# Patient Record
Sex: FEMALE | Race: WHITE | NOT HISPANIC OR LATINO | Employment: OTHER | URBAN - METROPOLITAN AREA
[De-identification: names, ages, dates, MRNs, and addresses within clinical notes are randomized per-mention and may not be internally consistent; named-entity substitution may affect disease eponyms.]

---

## 2023-02-08 PROBLEM — R26.9 GAIT DISTURBANCE: Status: ACTIVE | Noted: 2023-02-08

## 2023-02-08 PROBLEM — E78.5 HYPERLIPIDEMIA: Status: ACTIVE | Noted: 2023-02-08

## 2023-02-08 PROBLEM — L71.9 ROSACEA: Status: ACTIVE | Noted: 2023-02-08

## 2023-02-08 PROBLEM — K21.9 ESOPHAGEAL REFLUX: Status: ACTIVE | Noted: 2023-02-08

## 2023-02-08 PROBLEM — R74.8 ELEVATED ALKALINE PHOSPHATASE LEVEL: Status: ACTIVE | Noted: 2023-02-08

## 2023-02-08 PROBLEM — M54.2 NECK PAIN: Status: ACTIVE | Noted: 2023-02-08

## 2023-02-08 PROBLEM — R05.9 COUGH: Status: ACTIVE | Noted: 2023-02-08

## 2023-02-08 PROBLEM — M54.50 LOWER BACK PAIN: Status: ACTIVE | Noted: 2023-02-08

## 2023-02-08 PROBLEM — L30.9 ECZEMA OF BOTH HANDS: Status: ACTIVE | Noted: 2023-02-08

## 2023-02-08 PROBLEM — K83.8 DILATED INTRAHEPATIC BILE DUCT: Status: ACTIVE | Noted: 2023-02-08

## 2023-02-08 PROBLEM — J01.90 ACUTE SINUSITIS: Status: ACTIVE | Noted: 2023-02-08

## 2023-02-08 PROBLEM — E11.65 TYPE 2 DIABETES MELLITUS WITH HYPERGLYCEMIA, WITHOUT LONG-TERM CURRENT USE OF INSULIN (MULTI): Status: ACTIVE | Noted: 2023-02-08

## 2023-02-08 PROBLEM — M43.10 SPONDYLOLISTHESIS, ACQUIRED: Status: ACTIVE | Noted: 2023-02-08

## 2023-02-08 PROBLEM — H91.90 HEARING LOSS: Status: ACTIVE | Noted: 2023-02-08

## 2023-02-08 PROBLEM — R10.30 LOWER ABDOMINAL PAIN: Status: ACTIVE | Noted: 2023-02-08

## 2023-02-08 PROBLEM — L30.9 ECZEMA OF FACE: Status: ACTIVE | Noted: 2023-02-08

## 2023-02-08 PROBLEM — N60.01 CYST OF RIGHT BREAST: Status: ACTIVE | Noted: 2023-02-08

## 2023-02-08 PROBLEM — R49.0 HOARSENESS: Status: ACTIVE | Noted: 2023-02-08

## 2023-02-08 PROBLEM — N32.81 OVERACTIVE BLADDER: Status: ACTIVE | Noted: 2023-02-08

## 2023-02-08 PROBLEM — R10.2 PELVIC PAIN IN FEMALE: Status: ACTIVE | Noted: 2023-02-08

## 2023-02-08 PROBLEM — F32.9 MAJOR DEPRESSIVE DISORDER: Status: ACTIVE | Noted: 2023-02-08

## 2023-02-08 PROBLEM — M47.812 CERVICAL SPONDYLOSIS: Status: ACTIVE | Noted: 2023-02-08

## 2023-02-08 PROBLEM — R92.8 MAMMOGRAM ABNORMAL: Status: ACTIVE | Noted: 2023-02-08

## 2023-02-08 PROBLEM — M43.06 LUMBAR SPONDYLOLYSIS: Status: ACTIVE | Noted: 2023-02-08

## 2023-02-08 PROBLEM — R63.4 ABNORMAL WEIGHT LOSS: Status: ACTIVE | Noted: 2023-02-08

## 2023-02-08 PROBLEM — R32 URINARY INCONTINENCE: Status: ACTIVE | Noted: 2023-02-08

## 2023-02-08 PROBLEM — E55.9 VITAMIN D DEFICIENCY: Status: ACTIVE | Noted: 2023-02-08

## 2023-02-08 PROBLEM — F43.0 STRESS REACTION, ACUTE, PREDOMINANT EMOTIONAL: Status: ACTIVE | Noted: 2023-02-08

## 2023-02-08 PROBLEM — G43.909 MIGRAINE, UNSPECIFIED, NOT INTRACTABLE, WITHOUT STATUS MIGRAINOSUS: Status: ACTIVE | Noted: 2023-02-08

## 2023-02-08 PROBLEM — R30.0 DYSURIA: Status: ACTIVE | Noted: 2023-02-08

## 2023-02-08 PROBLEM — E53.8 VITAMIN B12 DEFICIENCY: Status: ACTIVE | Noted: 2023-02-08

## 2023-02-08 PROBLEM — R53.83 FATIGUE: Status: ACTIVE | Noted: 2023-02-08

## 2023-02-08 PROBLEM — R31.9 HEMATURIA: Status: ACTIVE | Noted: 2023-02-08

## 2023-02-08 PROBLEM — R10.84 GENERALIZED ABDOMINAL DISCOMFORT: Status: ACTIVE | Noted: 2023-02-08

## 2023-02-08 PROBLEM — D72.829 LEUKOCYTOSIS: Status: ACTIVE | Noted: 2023-02-08

## 2023-02-08 PROBLEM — M25.551 RIGHT HIP PAIN: Status: ACTIVE | Noted: 2023-02-08

## 2023-02-08 PROBLEM — R10.12 INTERMITTENT LEFT UPPER QUADRANT ABDOMINAL PAIN: Status: ACTIVE | Noted: 2023-02-08

## 2023-02-08 PROBLEM — R11.0 DAILY NAUSEA: Status: ACTIVE | Noted: 2023-02-08

## 2023-02-08 PROBLEM — Q76.2 CONGENITAL SPONDYLOLISTHESIS: Status: ACTIVE | Noted: 2023-02-08

## 2023-02-08 PROBLEM — U07.1 COVID-19 VIRUS INFECTION: Status: ACTIVE | Noted: 2023-02-08

## 2023-02-08 RX ORDER — NAPROXEN 500 MG/1
500 TABLET ORAL 2 TIMES DAILY PRN
COMMUNITY

## 2023-02-08 RX ORDER — SIMVASTATIN 40 MG/1
40 TABLET, FILM COATED ORAL DAILY
COMMUNITY
End: 2023-06-21 | Stop reason: SDUPTHER

## 2023-02-08 RX ORDER — METFORMIN HYDROCHLORIDE 500 MG/1
500 TABLET, EXTENDED RELEASE ORAL
COMMUNITY
End: 2023-09-27 | Stop reason: WASHOUT

## 2023-02-08 RX ORDER — IBUPROFEN 600 MG/1
600 TABLET ORAL
COMMUNITY

## 2023-02-08 RX ORDER — OXYBUTYNIN CHLORIDE 10 MG/1
10 TABLET, EXTENDED RELEASE ORAL DAILY
COMMUNITY
End: 2023-09-27

## 2023-02-08 RX ORDER — ALBUTEROL SULFATE 90 UG/1
2 AEROSOL, METERED RESPIRATORY (INHALATION) EVERY 6 HOURS PRN
COMMUNITY

## 2023-02-08 RX ORDER — ONDANSETRON 4 MG/1
4 TABLET, FILM COATED ORAL EVERY 8 HOURS PRN
COMMUNITY
End: 2023-05-22 | Stop reason: SDUPTHER

## 2023-02-08 RX ORDER — CITALOPRAM 20 MG/1
40 TABLET, FILM COATED ORAL DAILY
COMMUNITY
End: 2023-03-13 | Stop reason: ALTCHOICE

## 2023-03-13 ENCOUNTER — OFFICE VISIT (OUTPATIENT)
Dept: PRIMARY CARE | Facility: CLINIC | Age: 53
End: 2023-03-13
Payer: COMMERCIAL

## 2023-03-13 VITALS
BODY MASS INDEX: 14.92 KG/M2 | SYSTOLIC BLOOD PRESSURE: 135 MMHG | HEIGHT: 59 IN | HEART RATE: 97 BPM | WEIGHT: 74 LBS | DIASTOLIC BLOOD PRESSURE: 93 MMHG

## 2023-03-13 DIAGNOSIS — E11.65 TYPE 2 DIABETES MELLITUS WITH HYPERGLYCEMIA, WITHOUT LONG-TERM CURRENT USE OF INSULIN (MULTI): ICD-10-CM

## 2023-03-13 DIAGNOSIS — F33.41 RECURRENT MAJOR DEPRESSIVE DISORDER, IN PARTIAL REMISSION (CMS-HCC): ICD-10-CM

## 2023-03-13 DIAGNOSIS — R03.0 ELEVATED BLOOD PRESSURE READING WITHOUT DIAGNOSIS OF HYPERTENSION: ICD-10-CM

## 2023-03-13 DIAGNOSIS — N63.20 MASS OF LEFT BREAST, UNSPECIFIED QUADRANT: Primary | ICD-10-CM

## 2023-03-13 PROCEDURE — 99214 OFFICE O/P EST MOD 30 MIN: CPT | Performed by: INTERNAL MEDICINE

## 2023-03-13 PROCEDURE — 3080F DIAST BP >= 90 MM HG: CPT | Performed by: INTERNAL MEDICINE

## 2023-03-13 PROCEDURE — 3075F SYST BP GE 130 - 139MM HG: CPT | Performed by: INTERNAL MEDICINE

## 2023-03-13 RX ORDER — CITALOPRAM 40 MG/1
40 TABLET, FILM COATED ORAL DAILY
Qty: 30 TABLET | Refills: 5 | Status: SHIPPED | OUTPATIENT
Start: 2023-03-13 | End: 2023-06-21 | Stop reason: ALTCHOICE

## 2023-03-13 NOTE — PROGRESS NOTES
"Subjective   She is here with her ex  and her grandson, Moises.  I am not sure if this appointment was scheduled as an ER follow-up.  The patient is just able to tell me that \"her mother got a text saying she had an appointment.\"  Mel Lantigua is a 53 y.o. female who presents for Diabetes and also a breast lump.  She went to the ER recently at [facility] because she had a lump on her left breast that was painful.  She said that her mother felt the lump.   She says the other day she felt a lump under her armpit.   She says they told her to get a mammogram.   Her last mammo ws 2021, and was normal.     She also mentioned that she's been having erratic sugars.   She says that she had a sugar that was over 200 a few mornings ago.  Today she says it was 179.    She mentioned that she is tired a lot and she is concerned about it.  She had labs today which included a CBC TSH CMP and A1c.    She says that when she gets tired she goes to take a nap and sometimes a nap will last up to 4 hours.  She says she thinks that people are mad at her when she takes a nap.  She mentions that she just has no motivation.    Review of Systems    Objective   BP (!) 135/93   Pulse 97   Ht 1.499 m (4' 11\")   Wt 33.6 kg (74 lb)   BMI 14.95 kg/m²    Physical Exam  Cardiovascular:      Rate and Rhythm: Normal rate and regular rhythm.      Heart sounds: Normal heart sounds.   Pulmonary:      Effort: Pulmonary effort is normal.      Breath sounds: Normal breath sounds.   Neurological:      General: No focal deficit present.      Mental Status: She is alert.         Assessment/Plan   Problem List Items Addressed This Visit          Other    Major depressive disorder: I think that this is part of the reason behind her fatigue.  She is under lots of stress on a chronic basis.  We will increase her citalopram dose from 20 to 40 mg and see if this helps.  I sent in a new prescription.    Relevant Medications    citalopram (CeleXA) " 40 mg tablet     Other Visit Diagnoses       Mass of left breast, unspecified quadrant    -  Primary. Gave order for diagnostic mammogram. I was not able to access her ER report.  Her ex- who was with her was upset because she was charged when she had a mammogram.    Relevant Orders    BI mammo bilateral screening tomosynthesis        Type 2 diabetes mellitus with hyperglycemia without long-term insulin use: Await today's lab results.  She remains on metformin alone.  Asked her to bring her blood sugar meter with her to her next visit.  Elevated blood pressure without hypertension: Continue to monitor her blood pressures.

## 2023-03-13 NOTE — PATIENT INSTRUCTIONS
Begin taking two of the 20 mg tablets of citalopram at one time every day,.  I sent in a new prescription for citalopram 40 mg.   Once you are done with the 20 mg tabs start taking just one of the 40 mg tabs.  Schedule a DIAGNOSITC MAMMOGRAM .    Bring your blood sugar meter with you to your next visit.   Schedule a 30 min visit with me after you get the mammogram done.

## 2023-03-14 LAB
ALANINE AMINOTRANSFERASE (SGPT) (U/L) IN SER/PLAS: 8 U/L (ref 7–45)
ALBUMIN (G/DL) IN SER/PLAS: 4.2 G/DL (ref 3.4–5)
ALKALINE PHOSPHATASE (U/L) IN SER/PLAS: 168 U/L (ref 33–110)
ANION GAP IN SER/PLAS: 14 MMOL/L (ref 10–20)
ASPARTATE AMINOTRANSFERASE (SGOT) (U/L) IN SER/PLAS: 12 U/L (ref 9–39)
BASOPHILS (10*3/UL) IN BLOOD BY AUTOMATED COUNT: 0.05 X10E9/L (ref 0–0.1)
BASOPHILS/100 LEUKOCYTES IN BLOOD BY AUTOMATED COUNT: 0.5 % (ref 0–2)
BILIRUBIN TOTAL (MG/DL) IN SER/PLAS: 0.3 MG/DL (ref 0–1.2)
CALCIUM (MG/DL) IN SER/PLAS: 9.6 MG/DL (ref 8.6–10.6)
CARBON DIOXIDE, TOTAL (MMOL/L) IN SER/PLAS: 31 MMOL/L (ref 21–32)
CHLORIDE (MMOL/L) IN SER/PLAS: 102 MMOL/L (ref 98–107)
CHOLESTEROL (MG/DL) IN SER/PLAS: 133 MG/DL (ref 0–199)
CHOLESTEROL IN HDL (MG/DL) IN SER/PLAS: 54 MG/DL
CHOLESTEROL/HDL RATIO: 2.5
CREATININE (MG/DL) IN SER/PLAS: 0.59 MG/DL (ref 0.5–1.05)
EOSINOPHILS (10*3/UL) IN BLOOD BY AUTOMATED COUNT: 0.39 X10E9/L (ref 0–0.7)
EOSINOPHILS/100 LEUKOCYTES IN BLOOD BY AUTOMATED COUNT: 4.1 % (ref 0–6)
ERYTHROCYTE DISTRIBUTION WIDTH (RATIO) BY AUTOMATED COUNT: 14.1 % (ref 11.5–14.5)
ERYTHROCYTE MEAN CORPUSCULAR HEMOGLOBIN CONCENTRATION (G/DL) BY AUTOMATED: 32.6 G/DL (ref 32–36)
ERYTHROCYTE MEAN CORPUSCULAR VOLUME (FL) BY AUTOMATED COUNT: 100 FL (ref 80–100)
ERYTHROCYTES (10*6/UL) IN BLOOD BY AUTOMATED COUNT: 4.28 X10E12/L (ref 4–5.2)
ESTIMATED AVERAGE GLUCOSE FOR HBA1C: 148 MG/DL
GFR FEMALE: >90 ML/MIN/1.73M2
GLUCOSE (MG/DL) IN SER/PLAS: 107 MG/DL (ref 74–99)
HEMATOCRIT (%) IN BLOOD BY AUTOMATED COUNT: 42.6 % (ref 36–46)
HEMOGLOBIN (G/DL) IN BLOOD: 13.9 G/DL (ref 12–16)
HEMOGLOBIN A1C/HEMOGLOBIN TOTAL IN BLOOD: 6.8 %
IMMATURE GRANULOCYTES/100 LEUKOCYTES IN BLOOD BY AUTOMATED COUNT: 0.3 % (ref 0–0.9)
LDL: 63 MG/DL (ref 0–99)
LEUKOCYTES (10*3/UL) IN BLOOD BY AUTOMATED COUNT: 9.5 X10E9/L (ref 4.4–11.3)
LYMPHOCYTES (10*3/UL) IN BLOOD BY AUTOMATED COUNT: 3.47 X10E9/L (ref 1.2–4.8)
LYMPHOCYTES/100 LEUKOCYTES IN BLOOD BY AUTOMATED COUNT: 36.7 % (ref 13–44)
MONOCYTES (10*3/UL) IN BLOOD BY AUTOMATED COUNT: 0.76 X10E9/L (ref 0.1–1)
MONOCYTES/100 LEUKOCYTES IN BLOOD BY AUTOMATED COUNT: 8 % (ref 2–10)
NEUTROPHILS (10*3/UL) IN BLOOD BY AUTOMATED COUNT: 4.76 X10E9/L (ref 1.2–7.7)
NEUTROPHILS/100 LEUKOCYTES IN BLOOD BY AUTOMATED COUNT: 50.4 % (ref 40–80)
NRBC (PER 100 WBCS) BY AUTOMATED COUNT: 0.3 /100 WBC (ref 0–0)
PLATELETS (10*3/UL) IN BLOOD AUTOMATED COUNT: 360 X10E9/L (ref 150–450)
POTASSIUM (MMOL/L) IN SER/PLAS: 4.3 MMOL/L (ref 3.5–5.3)
PROTEIN TOTAL: 7 G/DL (ref 6.4–8.2)
SODIUM (MMOL/L) IN SER/PLAS: 143 MMOL/L (ref 136–145)
THYROTROPIN (MIU/L) IN SER/PLAS BY DETECTION LIMIT <= 0.05 MIU/L: 2.4 MIU/L (ref 0.44–3.98)
TRIGLYCERIDE (MG/DL) IN SER/PLAS: 82 MG/DL (ref 0–149)
UREA NITROGEN (MG/DL) IN SER/PLAS: 14 MG/DL (ref 6–23)
VLDL: 16 MG/DL (ref 0–40)

## 2023-03-22 ENCOUNTER — OFFICE VISIT (OUTPATIENT)
Dept: PRIMARY CARE | Facility: CLINIC | Age: 53
End: 2023-03-22
Payer: COMMERCIAL

## 2023-03-22 VITALS
DIASTOLIC BLOOD PRESSURE: 73 MMHG | SYSTOLIC BLOOD PRESSURE: 111 MMHG | HEIGHT: 57 IN | HEART RATE: 89 BPM | BODY MASS INDEX: 16.61 KG/M2 | WEIGHT: 77 LBS

## 2023-03-22 DIAGNOSIS — N60.01 CYST OF RIGHT BREAST: ICD-10-CM

## 2023-03-22 DIAGNOSIS — R74.8 ELEVATED ALKALINE PHOSPHATASE LEVEL: ICD-10-CM

## 2023-03-22 DIAGNOSIS — E11.65 TYPE 2 DIABETES MELLITUS WITH HYPERGLYCEMIA, WITHOUT LONG-TERM CURRENT USE OF INSULIN (MULTI): Primary | ICD-10-CM

## 2023-03-22 PROCEDURE — 3044F HG A1C LEVEL LT 7.0%: CPT | Performed by: INTERNAL MEDICINE

## 2023-03-22 PROCEDURE — 3074F SYST BP LT 130 MM HG: CPT | Performed by: INTERNAL MEDICINE

## 2023-03-22 PROCEDURE — 3078F DIAST BP <80 MM HG: CPT | Performed by: INTERNAL MEDICINE

## 2023-03-22 PROCEDURE — 99214 OFFICE O/P EST MOD 30 MIN: CPT | Performed by: INTERNAL MEDICINE

## 2023-03-22 NOTE — PATIENT INSTRUCTIONS
It is ok to use immodium ( loperamide) as needed for diarrhea.   Write down your sugars when you check them and bring them to your next visit.   You can use a warm wet washcloth and hold it on your armpit for 15 min a day.   This can help it to shrink the lump.   Remain on the 40 mg of celexa.   See me again in 3 months.   Get blood test done close to next visit.

## 2023-03-22 NOTE — PROGRESS NOTES
"Subjective   Mel Lantigua is a 53 y.o. female who presents for Follow-up.  She says the breast lump is still there  We did discuss normal mammogram results.     She is taking the  40 mg  citalopram now  She takes naps but less.   She displays poor insight     She has been having diarrhea at times  She wants a medicine for diarrhea  It does not seem to be related to meals  She does note that spicy food does not agree with her  She gets cramping at times  She had what she described as bad abdominal pain. It went away after 10 minutes.  She had a bowel accident today ; this embarrassed her.       Her Aic was 6.8  Her alk phos was 168: we will repeat.         Review of Systems    Objective   /73   Pulse 89   Ht 1.448 m (4' 9\")   Wt 34.9 kg (77 lb)   BMI 16.66 kg/m²    Physical Exam  Cardiovascular:      Rate and Rhythm: Normal rate and regular rhythm.      Heart sounds: Normal heart sounds.   Pulmonary:      Effort: Pulmonary effort is normal.      Breath sounds: Normal breath sounds.   Abdominal:      General: Abdomen is flat.      Palpations: Abdomen is soft. There is no mass.      Tenderness: There is abdominal tenderness. There is no guarding or rebound.      Comments: Mild diffuse tenderness.    Neurological:      Mental Status: She is alert.   Psychiatric:         Mood and Affect: Mood normal.         Assessment/Plan   Problem List Items Addressed This Visit          Endocrine/Metabolic    Type 2 diabetes mellitus with hyperglycemia, without long-term current use of insulin (CMS/[facility]) - Primary    Relevant Orders    Comprehensive metabolic panel    Hemoglobin A1c       Other    Cyst of right breast    Elevated alkaline phosphatase level: may be related to her dilated bile duct.     Relevant Orders    Comprehensive metabolic panel          Diarrhea: it is difficult to assess how often she has symptoms. Seems very embarassed by her accident.         "

## 2023-05-22 DIAGNOSIS — R11.0 DAILY NAUSEA: Primary | ICD-10-CM

## 2023-05-22 RX ORDER — ONDANSETRON 4 MG/1
4 TABLET, FILM COATED ORAL EVERY 8 HOURS PRN
Qty: 20 TABLET | Refills: 1 | Status: SHIPPED | OUTPATIENT
Start: 2023-05-22 | End: 2024-01-31 | Stop reason: SDUPTHER

## 2023-06-21 ENCOUNTER — OFFICE VISIT (OUTPATIENT)
Dept: PRIMARY CARE | Facility: CLINIC | Age: 53
End: 2023-06-21
Payer: COMMERCIAL

## 2023-06-21 VITALS
DIASTOLIC BLOOD PRESSURE: 80 MMHG | OXYGEN SATURATION: 96 % | WEIGHT: 76.8 LBS | BODY MASS INDEX: 16.57 KG/M2 | SYSTOLIC BLOOD PRESSURE: 106 MMHG | HEIGHT: 57 IN | HEART RATE: 90 BPM

## 2023-06-21 DIAGNOSIS — F33.41 RECURRENT MAJOR DEPRESSIVE DISORDER, IN PARTIAL REMISSION (CMS-HCC): ICD-10-CM

## 2023-06-21 DIAGNOSIS — E78.2 MIXED HYPERLIPIDEMIA: Primary | ICD-10-CM

## 2023-06-21 DIAGNOSIS — N39.41 URGE INCONTINENCE OF URINE: ICD-10-CM

## 2023-06-21 DIAGNOSIS — E11.65 TYPE 2 DIABETES MELLITUS WITH HYPERGLYCEMIA, WITHOUT LONG-TERM CURRENT USE OF INSULIN (MULTI): ICD-10-CM

## 2023-06-21 DIAGNOSIS — R11.0 NAUSEA: ICD-10-CM

## 2023-06-21 PROCEDURE — 99214 OFFICE O/P EST MOD 30 MIN: CPT | Performed by: INTERNAL MEDICINE

## 2023-06-21 PROCEDURE — 3079F DIAST BP 80-89 MM HG: CPT | Performed by: INTERNAL MEDICINE

## 2023-06-21 PROCEDURE — 3074F SYST BP LT 130 MM HG: CPT | Performed by: INTERNAL MEDICINE

## 2023-06-21 PROCEDURE — 3044F HG A1C LEVEL LT 7.0%: CPT | Performed by: INTERNAL MEDICINE

## 2023-06-21 RX ORDER — METFORMIN HYDROCHLORIDE 500 MG/1
500 TABLET ORAL NIGHTLY
COMMUNITY
End: 2023-09-27 | Stop reason: ALTCHOICE

## 2023-06-21 RX ORDER — ONDANSETRON 4 MG/1
4 TABLET, ORALLY DISINTEGRATING ORAL EVERY 8 HOURS PRN
Qty: 30 TABLET | Refills: 5 | Status: SHIPPED | OUTPATIENT
Start: 2023-06-21 | End: 2023-09-27 | Stop reason: SDUPTHER

## 2023-06-21 RX ORDER — CITALOPRAM 40 MG/1
40 TABLET, FILM COATED ORAL DAILY
Qty: 90 TABLET | Refills: 1 | Status: SHIPPED | OUTPATIENT
Start: 2023-06-21 | End: 2023-09-27 | Stop reason: SDUPTHER

## 2023-06-21 RX ORDER — SIMVASTATIN 40 MG/1
40 TABLET, FILM COATED ORAL NIGHTLY
COMMUNITY
End: 2024-06-04 | Stop reason: WASHOUT

## 2023-06-21 RX ORDER — ACETAMINOPHEN AND IBUPROFEN 250; 125 MG/1; MG/1
TABLET, FILM COATED ORAL
COMMUNITY

## 2023-06-21 RX ORDER — SIMVASTATIN 40 MG/1
40 TABLET, FILM COATED ORAL NIGHTLY
Qty: 90 TABLET | Refills: 1 | Status: SHIPPED | OUTPATIENT
Start: 2023-06-21 | End: 2023-09-27 | Stop reason: SDUPTHER

## 2023-06-21 RX ORDER — SIMVASTATIN 40 MG/1
1 TABLET, FILM COATED ORAL DAILY
COMMUNITY
End: 2024-06-04 | Stop reason: WASHOUT

## 2023-06-21 RX ORDER — METFORMIN HYDROCHLORIDE 500 MG/1
500 TABLET, EXTENDED RELEASE ORAL 2 TIMES DAILY
Qty: 180 TABLET | Refills: 1 | Status: SHIPPED | OUTPATIENT
Start: 2023-06-21 | End: 2023-09-27 | Stop reason: SDUPTHER

## 2023-06-21 RX ORDER — METFORMIN HYDROCHLORIDE 500 MG/1
TABLET, EXTENDED RELEASE ORAL
COMMUNITY
Start: 2018-12-05 | End: 2023-06-21 | Stop reason: SDUPTHER

## 2023-06-21 RX ORDER — CITALOPRAM 20 MG/1
40 TABLET, FILM COATED ORAL DAILY
COMMUNITY
End: 2023-06-21 | Stop reason: ALTCHOICE

## 2023-06-21 ASSESSMENT — PATIENT HEALTH QUESTIONNAIRE - PHQ9
7. TROUBLE CONCENTRATING ON THINGS, SUCH AS READING THE NEWSPAPER OR WATCHING TELEVISION: NOT AT ALL
3. TROUBLE FALLING OR STAYING ASLEEP OR SLEEPING TOO MUCH: NEARLY EVERY DAY
5. POOR APPETITE OR OVEREATING: NOT AT ALL
2. FEELING DOWN, DEPRESSED OR HOPELESS: SEVERAL DAYS
SUM OF ALL RESPONSES TO PHQ9 QUESTIONS 1 AND 2: 3
6. FEELING BAD ABOUT YOURSELF - OR THAT YOU ARE A FAILURE OR HAVE LET YOURSELF OR YOUR FAMILY DOWN: MORE THAN HALF THE DAYS
10. IF YOU CHECKED OFF ANY PROBLEMS, HOW DIFFICULT HAVE THESE PROBLEMS MADE IT FOR YOU TO DO YOUR WORK, TAKE CARE OF THINGS AT HOME, OR GET ALONG WITH OTHER PEOPLE: NOT DIFFICULT AT ALL
SUM OF ALL RESPONSES TO PHQ QUESTIONS 1-9: 12
9. THOUGHTS THAT YOU WOULD BE BETTER OFF DEAD, OR OF HURTING YOURSELF: NOT AT ALL
4. FEELING TIRED OR HAVING LITTLE ENERGY: NEARLY EVERY DAY
1. LITTLE INTEREST OR PLEASURE IN DOING THINGS: MORE THAN HALF THE DAYS
8. MOVING OR SPEAKING SO SLOWLY THAT OTHER PEOPLE COULD HAVE NOTICED. OR THE OPPOSITE, BEING SO FIGETY OR RESTLESS THAT YOU HAVE BEEN MOVING AROUND A LOT MORE THAN USUAL: SEVERAL DAYS

## 2023-06-21 ASSESSMENT — ENCOUNTER SYMPTOMS
DEPRESSION: 1
OCCASIONAL FEELINGS OF UNSTEADINESS: 0
LOSS OF SENSATION IN FEET: 0

## 2023-06-21 NOTE — PATIENT INSTRUCTIONS
Take just ONE metformin twice a day  Take one metformin in the morning and one in evening.     See me in 3 months.

## 2023-06-21 NOTE — PROGRESS NOTES
"Subjective   Mel Lantigua is a 53 y.o. female who presents for Follow-up (3 month follow-up).    Weight was 77 at last visit    He sugars were good   Usually less than 120    She is having diarrhea a few times a week  She takes kaopectate and sometimes immodium.    Having urinary issues  Says that at times she is not able to hold her urine  Sometimes she take a long time to finish urinating: incomplete emptying  They are going to be driving down to [place] in about 2 weeks.   She says she wants to get some Dep[employer] for the drive to [place]    She says he wants to stop smoking  She smokes less than a pack a day  She is coughing    Taking 2 metformin twice a day      Review of Systems    Objective   /80   Pulse 90   Ht 1.448 m (4' 9\")   Wt (!) 34.8 kg (76 lb 12.8 oz)   SpO2 96%   BMI 16.62 kg/m²    Physical Exam  Visit Vitals  /80   Pulse 90   Ht 1.448 m (4' 9\")   Wt (!) 34.8 kg (76 lb 12.8 oz)   SpO2 96%   BMI 16.62 kg/m²   Smoking Status Every Day   BSA 1.18 m²      GEN: NAD  HEENT: normal  NECK: no adenopathy, no thyroid enlargment  LUNGS: CTAB  CV: reg S1/S2 no murmurs  EXT: no leg edema   Assessment/Plan   Problem List Items Addressed This Visit    None    Type 2 dm with hyperglycemia w/o insulin use: check labs before next visit. Remain on current meds. She's having some trouble with her meter. She does check her sugars once a day have been in 120-130.  Urinary incontinence: she tried oxybutinin but it did not help . Discussed referral to Dr Doran for urodynamics. She is not sure about this and wants to discuss more at next visit.   Hyperlipid: remain on simvastatin.   Cough : she is still smoking, is about a pack a day, sometimes less.    See me in 3 mo.   "

## 2023-06-30 ENCOUNTER — APPOINTMENT (OUTPATIENT)
Dept: PRIMARY CARE | Facility: CLINIC | Age: 53
End: 2023-06-30
Payer: COMMERCIAL

## 2023-09-27 ENCOUNTER — OFFICE VISIT (OUTPATIENT)
Dept: PRIMARY CARE | Facility: CLINIC | Age: 53
End: 2023-09-27
Payer: COMMERCIAL

## 2023-09-27 VITALS
RESPIRATION RATE: 16 BRPM | BODY MASS INDEX: 18.68 KG/M2 | WEIGHT: 86.6 LBS | DIASTOLIC BLOOD PRESSURE: 78 MMHG | SYSTOLIC BLOOD PRESSURE: 116 MMHG | HEIGHT: 57 IN | HEART RATE: 75 BPM

## 2023-09-27 DIAGNOSIS — F33.41 RECURRENT MAJOR DEPRESSIVE DISORDER, IN PARTIAL REMISSION (CMS-HCC): ICD-10-CM

## 2023-09-27 DIAGNOSIS — E11.65 TYPE 2 DIABETES MELLITUS WITH HYPERGLYCEMIA, WITHOUT LONG-TERM CURRENT USE OF INSULIN (MULTI): ICD-10-CM

## 2023-09-27 DIAGNOSIS — E78.2 MIXED HYPERLIPIDEMIA: ICD-10-CM

## 2023-09-27 DIAGNOSIS — E55.9 VITAMIN D DEFICIENCY: ICD-10-CM

## 2023-09-27 DIAGNOSIS — Z23 FLU VACCINE NEED: ICD-10-CM

## 2023-09-27 DIAGNOSIS — G43.109 MIGRAINE WITH AURA AND WITHOUT STATUS MIGRAINOSUS, NOT INTRACTABLE: Primary | ICD-10-CM

## 2023-09-27 DIAGNOSIS — R11.0 NAUSEA: ICD-10-CM

## 2023-09-27 DIAGNOSIS — L20.9: ICD-10-CM

## 2023-09-27 PROCEDURE — 3074F SYST BP LT 130 MM HG: CPT | Performed by: INTERNAL MEDICINE

## 2023-09-27 PROCEDURE — 99214 OFFICE O/P EST MOD 30 MIN: CPT | Performed by: INTERNAL MEDICINE

## 2023-09-27 PROCEDURE — 90471 IMMUNIZATION ADMIN: CPT | Performed by: INTERNAL MEDICINE

## 2023-09-27 PROCEDURE — 3044F HG A1C LEVEL LT 7.0%: CPT | Performed by: INTERNAL MEDICINE

## 2023-09-27 PROCEDURE — 90686 IIV4 VACC NO PRSV 0.5 ML IM: CPT | Performed by: INTERNAL MEDICINE

## 2023-09-27 PROCEDURE — 3078F DIAST BP <80 MM HG: CPT | Performed by: INTERNAL MEDICINE

## 2023-09-27 RX ORDER — SIMVASTATIN 40 MG/1
40 TABLET, FILM COATED ORAL NIGHTLY
Qty: 90 TABLET | Refills: 1 | Status: SHIPPED | OUTPATIENT
Start: 2023-09-27 | End: 2024-01-31 | Stop reason: SDUPTHER

## 2023-09-27 RX ORDER — SUMATRIPTAN SUCCINATE 100 MG/1
TABLET ORAL
Qty: 27 TABLET | Refills: 1 | Status: SHIPPED | OUTPATIENT
Start: 2023-09-27

## 2023-09-27 RX ORDER — ONDANSETRON 4 MG/1
4 TABLET, ORALLY DISINTEGRATING ORAL EVERY 8 HOURS PRN
Qty: 30 TABLET | Refills: 5 | Status: SHIPPED | OUTPATIENT
Start: 2023-09-27

## 2023-09-27 RX ORDER — CITALOPRAM 40 MG/1
40 TABLET, FILM COATED ORAL DAILY
Qty: 90 TABLET | Refills: 1 | Status: SHIPPED | OUTPATIENT
Start: 2023-09-27 | End: 2024-01-31 | Stop reason: SDUPTHER

## 2023-09-27 RX ORDER — METFORMIN HYDROCHLORIDE 500 MG/1
500 TABLET, EXTENDED RELEASE ORAL 2 TIMES DAILY
Qty: 180 TABLET | Refills: 1 | Status: SHIPPED | OUTPATIENT
Start: 2023-09-27 | End: 2024-01-31 | Stop reason: SDUPTHER

## 2023-09-27 RX ORDER — HALOBETASOL PROPIONATE 0.5 MG/G
CREAM TOPICAL
Qty: 15 G | Refills: 2 | Status: SHIPPED | OUTPATIENT
Start: 2023-09-27

## 2023-09-27 ASSESSMENT — PATIENT HEALTH QUESTIONNAIRE - PHQ9
SUM OF ALL RESPONSES TO PHQ9 QUESTIONS 1 AND 2: 0
1. LITTLE INTEREST OR PLEASURE IN DOING THINGS: NOT AT ALL
2. FEELING DOWN, DEPRESSED OR HOPELESS: NOT AT ALL

## 2023-09-27 ASSESSMENT — PAIN SCALES - GENERAL: PAINLEVEL: 0-NO PAIN

## 2023-09-27 NOTE — PROGRESS NOTES
"Subjective   Mel Lantigua is a 53 y.o. female who presents for Follow-up.    Her daughter is getting  in [place] on her birthday next year  Her trip to [place] went well    Her blood sugars have been fairly good in 90 to 120's  Had a few 140    She does have some cracking at her fingertips, it is just on her right index and middle finger.    She says she is getting tired again   She does stay up at night at times later    She has been getting migraines, the only way to get rid of them is to rest  They usually happen later in the day  She does use tylenol  Can last 2 to 3 hours  Will at times get nausea with her headaches    She has nausea in am at times  She does not mention heartburn.  She does have ondansetron      Review of Systems  No chest pain  Objective   /78 (BP Location: Right arm, Patient Position: Sitting, BP Cuff Size: Adult)   Pulse 75   Resp 16   Ht 1.448 m (4' 9\")   Wt (!) 39.3 kg (86 lb 9.6 oz)   BMI 18.74 kg/m²    Physical Exam  Visit Vitals  /78 (BP Location: Right arm, Patient Position: Sitting, BP Cuff Size: Adult)   Pulse 75   Resp 16   Ht 1.448 m (4' 9\")   Wt (!) 39.3 kg (86 lb 9.6 oz)   BMI 18.74 kg/m²   Smoking Status Every Day   BSA 1.26 m²      GEN: NAD  HEENT: normal  NECK: no adenopathy, no thyroid enlargment  LUNGS: CTAB  CV: reg S1/S2 no murmurs  EXT: no leg edema   She does have cracks in the skin on her right index and middle finger.  Assessment/Plan   Problem List Items Addressed This Visit       Hyperlipidemia     Relevant Medications    simvastatin (Zocor) 40 mg tablet refill submitted.    Major depressive disorder she is stable and seems to be doing well right now.    Relevant Medications    citalopram (CeleXA) 40 mg tablet refill submitted.    Migraine, unspecified, not intractable, without status migrainosus - Primary    Relevant Medications    SUMAtriptan (Imitrex) 100 mg tablet she had been on this in the past.  It seems like she has forgotten about " it.  Rx was sent for 27 tabs which is 90 days and 1 refill.  She can also take it at the same time as naproxen if she wishes.    Type 2 diabetes mellitus with hyperglycemia, without long-term current use of insulin (CMS/[facility])    Relevant Medications    metFORMIN  mg 24 hr tablet 1 twice daily.    Other Relevant Orders    Comprehensive Metabolic Panel    Hemoglobin A1C    Vitamin D deficiency    Relevant Orders    Vitamin D 25-Hydroxy,Total (for eval of Vitamin D levels)     Other Visit Diagnoses       Flu vaccine need        Relevant Orders    Flu vaccine (IIV4) age 6 months and greater, preservative free (Completed)    Constitutional eczema of tip of finger        Relevant Medications    halobetasol (UltraVATE) 0.05 % cream Rx for halobetasol ointment sent.    Nausea        Relevant Medications    ondansetron ODT (Zofran-ODT) 4 mg disintegrating tablet Rx sent today.

## 2023-09-27 NOTE — PATIENT INSTRUCTIONS
You can take one SUMATRIPTAN 100 mg tablet at the start of a migraine .    You can take one more in two hours if your headache has not gone away.    You can also take naproxen at the same time as the sumatriptan for your migraines.     See me in January .    Get blood test done a week before the visit .  Get blood test done after fasting overnight.  The  lab is on the first floor.  Lab hours are 7 am to 4 pm Mon-Fri and 8am to Noon on Saturday.  No appt is needed.  Orders are entered into the system so you do not need a paper order.

## 2023-10-26 ENCOUNTER — TELEPHONE (OUTPATIENT)
Dept: PRIMARY CARE | Facility: CLINIC | Age: 53
End: 2023-10-26
Payer: COMMERCIAL

## 2023-10-26 NOTE — TELEPHONE ENCOUNTER
Patient calling- has right ear pain and it feels blocked. Asking please to be seen.  [phone]

## 2023-10-26 NOTE — TELEPHONE ENCOUNTER
Pt notified to visit [facility] for her ear sx; see Dr. SAINI's note below.  yajaira

## 2024-01-27 ENCOUNTER — LAB (OUTPATIENT)
Dept: LAB | Facility: LAB | Age: 54
End: 2024-01-27
Payer: COMMERCIAL

## 2024-01-27 DIAGNOSIS — E11.65 TYPE 2 DIABETES MELLITUS WITH HYPERGLYCEMIA, WITHOUT LONG-TERM CURRENT USE OF INSULIN (MULTI): ICD-10-CM

## 2024-01-27 DIAGNOSIS — E55.9 VITAMIN D DEFICIENCY: ICD-10-CM

## 2024-01-27 PROCEDURE — 83036 HEMOGLOBIN GLYCOSYLATED A1C: CPT

## 2024-01-27 PROCEDURE — 82306 VITAMIN D 25 HYDROXY: CPT

## 2024-01-27 PROCEDURE — 36415 COLL VENOUS BLD VENIPUNCTURE: CPT

## 2024-01-27 PROCEDURE — 80053 COMPREHEN METABOLIC PANEL: CPT

## 2024-01-28 LAB
25(OH)D3 SERPL-MCNC: 19 NG/ML (ref 30–100)
ALBUMIN SERPL BCP-MCNC: 4.4 G/DL (ref 3.4–5)
ALP SERPL-CCNC: 77 U/L (ref 33–110)
ALT SERPL W P-5'-P-CCNC: 12 U/L (ref 7–45)
ANION GAP SERPL CALC-SCNC: 13 MMOL/L (ref 10–20)
AST SERPL W P-5'-P-CCNC: 17 U/L (ref 9–39)
BILIRUB SERPL-MCNC: 0.5 MG/DL (ref 0–1.2)
BUN SERPL-MCNC: 10 MG/DL (ref 6–23)
CALCIUM SERPL-MCNC: 10.1 MG/DL (ref 8.6–10.6)
CHLORIDE SERPL-SCNC: 101 MMOL/L (ref 98–107)
CO2 SERPL-SCNC: 34 MMOL/L (ref 21–32)
CREAT SERPL-MCNC: 0.64 MG/DL (ref 0.5–1.05)
EGFRCR SERPLBLD CKD-EPI 2021: >90 ML/MIN/1.73M*2
EST. AVERAGE GLUCOSE BLD GHB EST-MCNC: 154 MG/DL
GLUCOSE SERPL-MCNC: 107 MG/DL (ref 74–99)
HBA1C MFR BLD: 7 %
POTASSIUM SERPL-SCNC: 4.1 MMOL/L (ref 3.5–5.3)
PROT SERPL-MCNC: 7 G/DL (ref 6.4–8.2)
SODIUM SERPL-SCNC: 144 MMOL/L (ref 136–145)

## 2024-01-31 ENCOUNTER — OFFICE VISIT (OUTPATIENT)
Dept: PRIMARY CARE | Facility: CLINIC | Age: 54
End: 2024-01-31
Payer: COMMERCIAL

## 2024-01-31 VITALS
WEIGHT: 82.2 LBS | SYSTOLIC BLOOD PRESSURE: 115 MMHG | HEIGHT: 57 IN | RESPIRATION RATE: 16 BRPM | DIASTOLIC BLOOD PRESSURE: 73 MMHG | HEART RATE: 86 BPM | BODY MASS INDEX: 17.74 KG/M2

## 2024-01-31 DIAGNOSIS — K58.0 IRRITABLE BOWEL SYNDROME WITH DIARRHEA: Primary | ICD-10-CM

## 2024-01-31 DIAGNOSIS — R11.0 DAILY NAUSEA: ICD-10-CM

## 2024-01-31 DIAGNOSIS — F33.41 RECURRENT MAJOR DEPRESSIVE DISORDER, IN PARTIAL REMISSION (CMS-HCC): ICD-10-CM

## 2024-01-31 DIAGNOSIS — E11.65 TYPE 2 DIABETES MELLITUS WITH HYPERGLYCEMIA, WITHOUT LONG-TERM CURRENT USE OF INSULIN (MULTI): ICD-10-CM

## 2024-01-31 DIAGNOSIS — E78.2 MIXED HYPERLIPIDEMIA: ICD-10-CM

## 2024-01-31 DIAGNOSIS — E55.9 VITAMIN D DEFICIENCY: ICD-10-CM

## 2024-01-31 PROCEDURE — 3078F DIAST BP <80 MM HG: CPT | Performed by: INTERNAL MEDICINE

## 2024-01-31 PROCEDURE — 99214 OFFICE O/P EST MOD 30 MIN: CPT | Performed by: INTERNAL MEDICINE

## 2024-01-31 PROCEDURE — 3074F SYST BP LT 130 MM HG: CPT | Performed by: INTERNAL MEDICINE

## 2024-01-31 PROCEDURE — 3051F HG A1C>EQUAL 7.0%<8.0%: CPT | Performed by: INTERNAL MEDICINE

## 2024-01-31 RX ORDER — ACETAMINOPHEN 500 MG
5000 TABLET ORAL DAILY
Qty: 90 TABLET | Refills: 1 | Status: SHIPPED | OUTPATIENT
Start: 2024-01-31

## 2024-01-31 RX ORDER — LOPERAMIDE HYDROCHLORIDE 2 MG/1
2 CAPSULE ORAL 4 TIMES DAILY PRN
Qty: 60 CAPSULE | Refills: 0 | Status: SHIPPED | OUTPATIENT
Start: 2024-01-31

## 2024-01-31 RX ORDER — METFORMIN HYDROCHLORIDE 500 MG/1
500 TABLET, EXTENDED RELEASE ORAL 2 TIMES DAILY
Qty: 180 TABLET | Refills: 1 | Status: SHIPPED | OUTPATIENT
Start: 2024-01-31 | End: 2024-03-14 | Stop reason: SINTOL

## 2024-01-31 RX ORDER — CITALOPRAM 40 MG/1
40 TABLET, FILM COATED ORAL DAILY
Qty: 90 TABLET | Refills: 1 | Status: SHIPPED | OUTPATIENT
Start: 2024-01-31

## 2024-01-31 RX ORDER — ONDANSETRON 4 MG/1
4 TABLET, FILM COATED ORAL EVERY 8 HOURS PRN
Qty: 20 TABLET | Refills: 1 | Status: SHIPPED | OUTPATIENT
Start: 2024-01-31

## 2024-01-31 RX ORDER — SIMVASTATIN 40 MG/1
40 TABLET, FILM COATED ORAL NIGHTLY
Qty: 90 TABLET | Refills: 1 | Status: SHIPPED | OUTPATIENT
Start: 2024-01-31

## 2024-01-31 ASSESSMENT — PAIN SCALES - GENERAL: PAINLEVEL: 0-NO PAIN

## 2024-01-31 NOTE — PATIENT INSTRUCTIONS
Can use the LOPERAMIDE as  needed to prevent diarrhea.     Can take VITAMIN D3 5,000 units one daily.   Begin when you get the prescription.     See me again in 3 months.

## 2024-01-31 NOTE — PROGRESS NOTES
"Subjective   Mel Lantigua is a 54 y.o. female who presents for Follow-up (Pt has c/o of feeling tired and no appetite. ).    She had a handwritten log of her fasting sugars  They were mainly 100's to 130's  She is taking metformin 500 twice daily    She complains of \"having the runs\"  It will come and go; she can't define any triggers, although tomatoes do make things worse  She says she gets stomach pain and cramping  Last month she had diarrhea frequently  Her appetite is variable  She has been taking kaopectate    She still has headaches mainly with stress  She said she has occasionally used the sumatriptan  She says that her headaches get better when she lays down    She is sleeping better with taking melatonin    She is busy with her 2 grandkids    Review of Systems    Objective   /73 (BP Location: Right arm, Patient Position: Sitting, BP Cuff Size: Adult)   Pulse 86   Resp 16   Ht 1.448 m (4' 9\")   Wt (!) 37.3 kg (82 lb 3.2 oz)   BMI 17.79 kg/m²    Physical Exam       GEN: NAD  HEENT: normal  NECK: no adenopathy, no thyroid enlargment  LUNGS: CTAB  CV: reg S1/S2 no murmurs  EXT: no leg edema   Assessment/Plan   Problem List Items Addressed This Visit       Daily nausea    Relevant Medications    ondansetron (Zofran) 4 mg tablet    Hyperlipidemia    Relevant Medications    simvastatin (Zocor) 40 mg tablet    Major depressive disorder    Relevant Medications    citalopram (CeleXA) 40 mg tablet    Type 2 diabetes mellitus with hyperglycemia, without long-term current use of insulin (CMS/[facility])    Relevant Medications    metFORMIN  mg 24 hr tablet    Vitamin D deficiency    Relevant Medications    cholecalciferol (Vitamin D-3) 5,000 Units tablet     Other Visit Diagnoses       Irritable bowel syndrome with diarrhea    -  Primary    Relevant Medications    loperamide (Imodium) 2 mg capsule               "

## 2024-03-05 ENCOUNTER — INPATIENT HOSPITAL (OUTPATIENT)
Dept: URBAN - METROPOLITAN AREA HOSPITAL 50 | Facility: HOSPITAL | Age: 54
End: 2024-03-05
Payer: COMMERCIAL

## 2024-03-05 DIAGNOSIS — R74.8 ABNORMAL LEVELS OF OTHER SERUM ENZYMES: ICD-10-CM

## 2024-03-05 DIAGNOSIS — E87.6 HYPOKALEMIA: ICD-10-CM

## 2024-03-05 DIAGNOSIS — D72.829 ELEVATED WHITE BLOOD CELL COUNT, UNSPECIFIED: ICD-10-CM

## 2024-03-05 DIAGNOSIS — R19.7 DIARRHEA, UNSPECIFIED: ICD-10-CM

## 2024-03-05 DIAGNOSIS — R10.84 GENERALIZED ABDOMINAL PAIN: ICD-10-CM

## 2024-03-05 PROCEDURE — 99222 1ST HOSP IP/OBS MODERATE 55: CPT | Performed by: INTERNAL MEDICINE

## 2024-03-06 ENCOUNTER — INPATIENT HOSPITAL (OUTPATIENT)
Dept: URBAN - METROPOLITAN AREA HOSPITAL 50 | Facility: HOSPITAL | Age: 54
End: 2024-03-06
Payer: COMMERCIAL

## 2024-03-06 DIAGNOSIS — E87.6 HYPOKALEMIA: ICD-10-CM

## 2024-03-06 DIAGNOSIS — R19.7 DIARRHEA, UNSPECIFIED: ICD-10-CM

## 2024-03-06 DIAGNOSIS — R10.84 GENERALIZED ABDOMINAL PAIN: ICD-10-CM

## 2024-03-06 DIAGNOSIS — D72.829 ELEVATED WHITE BLOOD CELL COUNT, UNSPECIFIED: ICD-10-CM

## 2024-03-06 DIAGNOSIS — R74.8 ABNORMAL LEVELS OF OTHER SERUM ENZYMES: ICD-10-CM

## 2024-03-06 PROCEDURE — 99233 SBSQ HOSP IP/OBS HIGH 50: CPT | Performed by: NURSE PRACTITIONER

## 2024-03-07 PROCEDURE — 99233 SBSQ HOSP IP/OBS HIGH 50: CPT | Performed by: NURSE PRACTITIONER

## 2024-03-08 ENCOUNTER — PATIENT OUTREACH (OUTPATIENT)
Dept: PRIMARY CARE | Facility: CLINIC | Age: 54
End: 2024-03-08
Payer: COMMERCIAL

## 2024-03-08 RX ORDER — LANOLIN ALCOHOL/MO/W.PET/CERES
1000 CREAM (GRAM) TOPICAL
COMMUNITY
Start: 2024-03-07 | End: 2024-06-05

## 2024-03-08 RX ORDER — POTASSIUM CHLORIDE 750 MG/1
10 TABLET, EXTENDED RELEASE ORAL
COMMUNITY
Start: 2024-03-07 | End: 2024-04-06

## 2024-03-08 RX ORDER — AMOXICILLIN AND CLAVULANATE POTASSIUM 875; 125 MG/1; MG/1
875 TABLET, FILM COATED ORAL 2 TIMES DAILY
COMMUNITY
Start: 2024-03-07 | End: 2024-03-09

## 2024-03-08 NOTE — PROGRESS NOTES
Discharge Facility: [facility]  Discharge Diagnosis: Chronic diarrhea  Admission Date: 3/4/2024  Discharge Date: 3/7/2024    PCP Appointment Date: 3/14/2024  Specialist Appointment Date: D  Hospital Encounter and Summary: Linked  See discharge assessment below for further details  Engagement  Call Start Time: 1015 (3/8/2024 10:45 AM)    Medications  Medications reviewed with patient/caregiver?: Yes (new meds discussed with patient's mother, Hyun) (3/8/2024 10:45 AM)  Is the patient having any side effects they believe may be caused by any medication additions or changes?: No (3/8/2024 10:45 AM)  Does the patient have all medications ordered at discharge?: Yes (3/8/2024 10:45 AM)  Care Management Interventions: No intervention needed (3/8/2024 10:45 AM)  Prescription Comments: see med list (cyancobalamin; potassium; augmentin; metformin) (3/8/2024 10:45 AM)  Is the patient taking all medications as directed (includes completed medication regime)?: Yes (3/8/2024 10:45 AM)  Care Management Interventions: Provided patient education (3/8/2024 10:45 AM)    Appointments  Does the patient have a primary care provider?: Yes (3/8/2024 10:45 AM)  Care Management Interventions: Verified appointment date/time/provider (3/8/2024 10:45 AM)  Has the patient kept scheduled appointments due by today?: Yes (3/8/2024 10:45 AM)  Care Management Interventions: Advised patient to keep appointment (3/8/2024 10:45 AM)    Self Management  What Durable Medical Equipment (DME) was ordered?: denies need-has family support at home (3/8/2024 10:45 AM)    Patient Teaching  Does the patient have access to their discharge instructions?: Yes (3/8/2024 10:45 AM)  Care Management Interventions: Reviewed instructions with patient (3/8/2024 10:45 AM)  What is the patient's perception of their health status since discharge?: Improving (3/8/2024 10:45 AM)  Is the patient/caregiver able to teach back the hierarchy of who to call/visit for  "symptoms/problems? PCP, Specialist, Home Health nurse, Urgent Care, ED, 911: Yes (3/8/2024 10:45 AM)  Patient/Caregiver Education Comments: Spoke to patient's mother, Hyun, with permission. Hyun states the patient has been doing \"better\" since coming home and the diarrhea is not as much of a problem as it was before. Theyw ere able to  her prescriptions after discharge. States patient has good family support at home. Fup appts discussed during outreach call. (3/8/2024 10:45 AM)        "

## 2024-03-14 ENCOUNTER — OFFICE VISIT (OUTPATIENT)
Dept: PRIMARY CARE | Facility: CLINIC | Age: 54
End: 2024-03-14
Payer: COMMERCIAL

## 2024-03-14 VITALS
HEART RATE: 94 BPM | WEIGHT: 76.8 LBS | BODY MASS INDEX: 16.57 KG/M2 | DIASTOLIC BLOOD PRESSURE: 83 MMHG | HEIGHT: 57 IN | SYSTOLIC BLOOD PRESSURE: 117 MMHG | RESPIRATION RATE: 16 BRPM

## 2024-03-14 DIAGNOSIS — E83.42 HYPOMAGNESEMIA: ICD-10-CM

## 2024-03-14 DIAGNOSIS — E11.65 TYPE 2 DIABETES MELLITUS WITH HYPERGLYCEMIA, WITHOUT LONG-TERM CURRENT USE OF INSULIN (MULTI): Primary | ICD-10-CM

## 2024-03-14 DIAGNOSIS — E87.6 HYPOKALEMIA: ICD-10-CM

## 2024-03-14 DIAGNOSIS — E83.39 HYPOPHOSPHATASIA: ICD-10-CM

## 2024-03-14 DIAGNOSIS — K52.9 CHRONIC DIARRHEA: ICD-10-CM

## 2024-03-14 DIAGNOSIS — D72.829 LEUKOCYTOSIS, UNSPECIFIED TYPE: ICD-10-CM

## 2024-03-14 PROCEDURE — 3079F DIAST BP 80-89 MM HG: CPT | Performed by: INTERNAL MEDICINE

## 2024-03-14 PROCEDURE — 99214 OFFICE O/P EST MOD 30 MIN: CPT | Performed by: INTERNAL MEDICINE

## 2024-03-14 PROCEDURE — 3051F HG A1C>EQUAL 7.0%<8.0%: CPT | Performed by: INTERNAL MEDICINE

## 2024-03-14 PROCEDURE — 3074F SYST BP LT 130 MM HG: CPT | Performed by: INTERNAL MEDICINE

## 2024-03-14 RX ORDER — SAXAGLIPTIN 2.5 MG/1
2.5 TABLET, FILM COATED ORAL DAILY
Qty: 30 TABLET | Refills: 1 | Status: SHIPPED | OUTPATIENT
Start: 2024-03-14 | End: 2024-04-23 | Stop reason: SDUPTHER

## 2024-03-14 ASSESSMENT — PATIENT HEALTH QUESTIONNAIRE - PHQ9
1. LITTLE INTEREST OR PLEASURE IN DOING THINGS: NOT AT ALL
SUM OF ALL RESPONSES TO PHQ9 QUESTIONS 1 AND 2: 0
2. FEELING DOWN, DEPRESSED OR HOPELESS: NOT AT ALL

## 2024-03-14 ASSESSMENT — PAIN SCALES - GENERAL: PAINLEVEL: 0-NO PAIN

## 2024-03-14 NOTE — PROGRESS NOTES
"Subjective   Mel Lantigua is a 54 y.o. female who presents for Follow-up (Pt here for hospital follow up.).  @[facility]@    3/4 to 3/7 was admitted, as she had marked electrolyte disturbance   She had been having diarrhea for 3 weeks. She went to [place] first then another day went to [place] [facility]  Her WBC was elevated so she was treated with an antibiotic   She is going to follow up with GI ( Dr Peralta)  for a colonoscopy    She has not had diarrhea since she was in the hospital    She is taking potasssium    She has cut down on drinkning pop and is trying to drink more water    She is checking her sugars more  A1c in hospital was 7.0  Review of Systems    Objective   /83   Pulse 94   Resp 16   Ht 1.448 m (4' 9\")   Wt (!) 34.8 kg (76 lb 12.8 oz)   BMI 16.62 kg/m²    Physical Exam  Visit Vitals  /83   Pulse 94   Resp 16   Ht 1.448 m (4' 9\")   Wt (!) 34.8 kg (76 lb 12.8 oz)   BMI 16.62 kg/m²   Smoking Status Every Day   BSA 1.18 m²      GEN: NAD  HEENT: normal  NECK: no adenopathy, no thyroid enlargment  LUNGS: CTAB  CV: reg S1/S2 no murmurs  ABD: soft. She denies tenderness   EXT: no leg edema   Assessment/Plan   Problem List Items Addressed This Visit       Leukocytosis    Relevant Orders    CBC and Auto Differential    Type 2 diabetes mellitus with hyperglycemia, without long-term current use of insulin (CMS/[facility]) -     Relevant Medications    sAXagliptin (Onglyza) 2.5 mg tablet trial   She is no longer on metformin.   Had hypoglycemia on one sulfonylurea     Other Visit Diagnoses       Hypokalemia    continue her potassium until complete    Relevant Orders    Basic metabolic panel    Hypomagnesemia        Relevant Orders    Magnesium    Basic metabolic panel    Hypophosphatasia        Relevant Orders    Phosphorus    Chronic diarrhea    has actually calmed down. Keep follow up with GI.                "

## 2024-03-14 NOTE — PATIENT INSTRUCTIONS
Begin taking ONGLYZA 2.5 MG  one every day .  Is for diabetes.   Is the lowest dose.    Keep next appointment with me.    Get blood test done close to next visit with me.    You DO NOT need to fast for this blood test.

## 2024-03-19 ENCOUNTER — PATIENT OUTREACH (OUTPATIENT)
Dept: PRIMARY CARE | Facility: CLINIC | Age: 54
End: 2024-03-19
Payer: COMMERCIAL

## 2024-03-19 NOTE — PROGRESS NOTES
Unable to reach patient for call back after patient's follow up appointment with PCP.   KAMALJITM with call back number for patient to call if needed   If no voicemail available call attempts x 2 were made to contact the patient to assist with any questions or concerns patient may have.

## 2024-03-26 ENCOUNTER — OFFICE (OUTPATIENT)
Dept: URBAN - METROPOLITAN AREA CLINIC 26 | Facility: CLINIC | Age: 54
End: 2024-03-26
Payer: COMMERCIAL

## 2024-03-26 VITALS
DIASTOLIC BLOOD PRESSURE: 79 MMHG | SYSTOLIC BLOOD PRESSURE: 120 MMHG | HEIGHT: 57 IN | HEART RATE: 86 BPM | WEIGHT: 80 LBS | TEMPERATURE: 97.8 F

## 2024-03-26 DIAGNOSIS — Z09 ENCOUNTER FOR FOLLOW-UP EXAMINATION AFTER COMPLETED TREATMEN: ICD-10-CM

## 2024-03-26 DIAGNOSIS — R74.8 ABNORMAL LEVELS OF OTHER SERUM ENZYMES: ICD-10-CM

## 2024-03-26 DIAGNOSIS — R19.7 DIARRHEA, UNSPECIFIED: ICD-10-CM

## 2024-03-26 DIAGNOSIS — R93.3 ABNORMAL FINDINGS ON DIAGNOSTIC IMAGING OF OTHER PARTS OF DI: ICD-10-CM

## 2024-03-26 DIAGNOSIS — Z86.010 PERSONAL HISTORY OF COLONIC POLYPS: ICD-10-CM

## 2024-03-26 PROCEDURE — 99214 OFFICE O/P EST MOD 30 MIN: CPT

## 2024-03-27 VITALS
DIASTOLIC BLOOD PRESSURE: 63 MMHG | SYSTOLIC BLOOD PRESSURE: 120 MMHG | OXYGEN SATURATION: 100 % | DIASTOLIC BLOOD PRESSURE: 66 MMHG | RESPIRATION RATE: 20 BRPM | HEART RATE: 72 BPM | SYSTOLIC BLOOD PRESSURE: 102 MMHG | RESPIRATION RATE: 12 BRPM | HEART RATE: 82 BPM | TEMPERATURE: 98.2 F | SYSTOLIC BLOOD PRESSURE: 91 MMHG | HEART RATE: 82 BPM | RESPIRATION RATE: 13 BRPM | RESPIRATION RATE: 16 BRPM | SYSTOLIC BLOOD PRESSURE: 91 MMHG | RESPIRATION RATE: 20 BRPM | DIASTOLIC BLOOD PRESSURE: 60 MMHG | RESPIRATION RATE: 15 BRPM | SYSTOLIC BLOOD PRESSURE: 107 MMHG | DIASTOLIC BLOOD PRESSURE: 51 MMHG | DIASTOLIC BLOOD PRESSURE: 47 MMHG | SYSTOLIC BLOOD PRESSURE: 73 MMHG | TEMPERATURE: 98.2 F | HEART RATE: 75 BPM | SYSTOLIC BLOOD PRESSURE: 102 MMHG | SYSTOLIC BLOOD PRESSURE: 80 MMHG | HEART RATE: 76 BPM | DIASTOLIC BLOOD PRESSURE: 43 MMHG | RESPIRATION RATE: 17 BRPM | RESPIRATION RATE: 11 BRPM | RESPIRATION RATE: 16 BRPM | DIASTOLIC BLOOD PRESSURE: 63 MMHG | DIASTOLIC BLOOD PRESSURE: 69 MMHG | HEART RATE: 81 BPM | HEART RATE: 75 BPM | OXYGEN SATURATION: 98 % | HEART RATE: 74 BPM | SYSTOLIC BLOOD PRESSURE: 91 MMHG | RESPIRATION RATE: 14 BRPM | SYSTOLIC BLOOD PRESSURE: 102 MMHG | SYSTOLIC BLOOD PRESSURE: 107 MMHG | RESPIRATION RATE: 20 BRPM | HEART RATE: 72 BPM | DIASTOLIC BLOOD PRESSURE: 48 MMHG | WEIGHT: 80 LBS | OXYGEN SATURATION: 100 % | RESPIRATION RATE: 13 BRPM | DIASTOLIC BLOOD PRESSURE: 55 MMHG | OXYGEN SATURATION: 98 % | SYSTOLIC BLOOD PRESSURE: 139 MMHG | DIASTOLIC BLOOD PRESSURE: 66 MMHG | WEIGHT: 80 LBS | RESPIRATION RATE: 14 BRPM | SYSTOLIC BLOOD PRESSURE: 94 MMHG | RESPIRATION RATE: 12 BRPM | HEART RATE: 75 BPM | OXYGEN SATURATION: 99 % | DIASTOLIC BLOOD PRESSURE: 60 MMHG | RESPIRATION RATE: 17 BRPM | HEART RATE: 82 BPM | SYSTOLIC BLOOD PRESSURE: 80 MMHG | SYSTOLIC BLOOD PRESSURE: 80 MMHG | DIASTOLIC BLOOD PRESSURE: 66 MMHG | OXYGEN SATURATION: 98 % | HEIGHT: 57 IN | RESPIRATION RATE: 14 BRPM | SYSTOLIC BLOOD PRESSURE: 78 MMHG | RESPIRATION RATE: 17 BRPM | DIASTOLIC BLOOD PRESSURE: 55 MMHG | HEART RATE: 74 BPM | TEMPERATURE: 98.2 F | DIASTOLIC BLOOD PRESSURE: 69 MMHG | SYSTOLIC BLOOD PRESSURE: 73 MMHG | SYSTOLIC BLOOD PRESSURE: 139 MMHG | SYSTOLIC BLOOD PRESSURE: 107 MMHG | HEART RATE: 74 BPM | DIASTOLIC BLOOD PRESSURE: 82 MMHG | DIASTOLIC BLOOD PRESSURE: 69 MMHG | RESPIRATION RATE: 11 BRPM | SYSTOLIC BLOOD PRESSURE: 117 MMHG | HEART RATE: 76 BPM | RESPIRATION RATE: 15 BRPM | HEART RATE: 81 BPM | DIASTOLIC BLOOD PRESSURE: 51 MMHG | HEART RATE: 81 BPM | WEIGHT: 80 LBS | SYSTOLIC BLOOD PRESSURE: 78 MMHG | SYSTOLIC BLOOD PRESSURE: 94 MMHG | DIASTOLIC BLOOD PRESSURE: 47 MMHG | DIASTOLIC BLOOD PRESSURE: 48 MMHG | HEART RATE: 72 BPM | SYSTOLIC BLOOD PRESSURE: 120 MMHG | RESPIRATION RATE: 12 BRPM | DIASTOLIC BLOOD PRESSURE: 55 MMHG | DIASTOLIC BLOOD PRESSURE: 47 MMHG | DIASTOLIC BLOOD PRESSURE: 48 MMHG | DIASTOLIC BLOOD PRESSURE: 82 MMHG | DIASTOLIC BLOOD PRESSURE: 51 MMHG | HEIGHT: 57 IN | SYSTOLIC BLOOD PRESSURE: 117 MMHG | RESPIRATION RATE: 13 BRPM | RESPIRATION RATE: 16 BRPM | SYSTOLIC BLOOD PRESSURE: 139 MMHG | DIASTOLIC BLOOD PRESSURE: 43 MMHG | SYSTOLIC BLOOD PRESSURE: 73 MMHG | RESPIRATION RATE: 11 BRPM | SYSTOLIC BLOOD PRESSURE: 120 MMHG | SYSTOLIC BLOOD PRESSURE: 117 MMHG | DIASTOLIC BLOOD PRESSURE: 63 MMHG | DIASTOLIC BLOOD PRESSURE: 82 MMHG | SYSTOLIC BLOOD PRESSURE: 78 MMHG | RESPIRATION RATE: 15 BRPM | OXYGEN SATURATION: 99 % | DIASTOLIC BLOOD PRESSURE: 60 MMHG | OXYGEN SATURATION: 99 % | DIASTOLIC BLOOD PRESSURE: 43 MMHG | OXYGEN SATURATION: 100 % | HEIGHT: 57 IN | SYSTOLIC BLOOD PRESSURE: 94 MMHG | HEART RATE: 76 BPM

## 2024-04-01 ENCOUNTER — TELEPHONE (OUTPATIENT)
Dept: GASTROENTEROLOGY | Facility: CLINIC | Age: 54
End: 2024-04-01
Payer: COMMERCIAL

## 2024-04-03 ENCOUNTER — AMBULATORY SURGICAL CENTER (OUTPATIENT)
Dept: URBAN - METROPOLITAN AREA SURGERY 12 | Facility: SURGERY | Age: 54
End: 2024-04-03
Payer: COMMERCIAL

## 2024-04-03 ENCOUNTER — OFFICE (OUTPATIENT)
Dept: URBAN - METROPOLITAN AREA PATHOLOGY 2 | Facility: PATHOLOGY | Age: 54
End: 2024-04-03
Payer: COMMERCIAL

## 2024-04-03 ENCOUNTER — AMBULATORY SURGICAL CENTER (OUTPATIENT)
Dept: URBAN - METROPOLITAN AREA SURGERY 12 | Facility: SURGERY | Age: 54
End: 2024-04-03

## 2024-04-03 DIAGNOSIS — K64.8 OTHER HEMORRHOIDS: ICD-10-CM

## 2024-04-03 DIAGNOSIS — K63.5 POLYP OF COLON: ICD-10-CM

## 2024-04-03 DIAGNOSIS — Z86.010 PERSONAL HISTORY OF COLONIC POLYPS: ICD-10-CM

## 2024-04-03 DIAGNOSIS — R19.7 DIARRHEA, UNSPECIFIED: ICD-10-CM

## 2024-04-03 PROCEDURE — 88313 SPECIAL STAINS GROUP 2: CPT | Mod: TC | Performed by: PATHOLOGY

## 2024-04-03 PROCEDURE — 45380 COLONOSCOPY AND BIOPSY: CPT | Mod: 59 | Performed by: INTERNAL MEDICINE

## 2024-04-03 PROCEDURE — 45384 COLONOSCOPY W/LESION REMOVAL: CPT | Performed by: INTERNAL MEDICINE

## 2024-04-03 PROCEDURE — 88342 IMHCHEM/IMCYTCHM 1ST ANTB: CPT | Mod: TC | Performed by: PATHOLOGY

## 2024-04-03 PROCEDURE — 88305 TISSUE EXAM BY PATHOLOGIST: CPT | Mod: TC | Performed by: PATHOLOGY

## 2024-04-23 ENCOUNTER — OFFICE VISIT (OUTPATIENT)
Dept: PRIMARY CARE | Facility: CLINIC | Age: 54
End: 2024-04-23
Payer: COMMERCIAL

## 2024-04-23 VITALS
BODY MASS INDEX: 17.78 KG/M2 | HEART RATE: 76 BPM | SYSTOLIC BLOOD PRESSURE: 118 MMHG | RESPIRATION RATE: 16 BRPM | WEIGHT: 82.4 LBS | DIASTOLIC BLOOD PRESSURE: 78 MMHG | HEIGHT: 57 IN

## 2024-04-23 DIAGNOSIS — E78.2 MIXED HYPERLIPIDEMIA: Primary | ICD-10-CM

## 2024-04-23 DIAGNOSIS — E11.65 TYPE 2 DIABETES MELLITUS WITH HYPERGLYCEMIA, WITHOUT LONG-TERM CURRENT USE OF INSULIN (MULTI): ICD-10-CM

## 2024-04-23 DIAGNOSIS — G43.709 CHRONIC MIGRAINE WITHOUT AURA WITHOUT STATUS MIGRAINOSUS, NOT INTRACTABLE: ICD-10-CM

## 2024-04-23 PROCEDURE — 3074F SYST BP LT 130 MM HG: CPT | Performed by: INTERNAL MEDICINE

## 2024-04-23 PROCEDURE — 99214 OFFICE O/P EST MOD 30 MIN: CPT | Performed by: INTERNAL MEDICINE

## 2024-04-23 PROCEDURE — 3051F HG A1C>EQUAL 7.0%<8.0%: CPT | Performed by: INTERNAL MEDICINE

## 2024-04-23 PROCEDURE — 3078F DIAST BP <80 MM HG: CPT | Performed by: INTERNAL MEDICINE

## 2024-04-23 RX ORDER — SAXAGLIPTIN 2.5 MG/1
2.5 TABLET, FILM COATED ORAL DAILY
Qty: 90 TABLET | Refills: 3 | Status: SHIPPED | OUTPATIENT
Start: 2024-04-23 | End: 2024-06-04 | Stop reason: ALTCHOICE

## 2024-04-23 ASSESSMENT — PAIN SCALES - GENERAL: PAINLEVEL: 0-NO PAIN

## 2024-04-23 NOTE — PROGRESS NOTES
"Subjective   Mel Lantigua is a 54 y.o. female who presents for Follow-up.    She says that she did not get her blood tests done    She says her sugar was 148 in the morning  Says that if she does not eat right before bed, her sugar is \"good\" in the morning  The best result she has was \"112\"     She has not had any more diarrhea since her hospitalization in March    She says she gets a headache or a migraine \"every other day\"  She said she is taking sumatriptan which helps  She said that she commonly wakes up with a migraine    She also gets \"nasty pain\" in her upper abdomen and at times she feels like she is dying   She did not want to try dicyclomine again , had tried in the past and did not help     She says that she finds she is restless at night often when she's trying to go to sleep   Her grandson stays up at night , every other night she will stay up at night with him  She plays with her grand kids in the evening    She falls asleep at 3 am and wakes up between 8 or 9 am.   She has always been a night owl     Review of Systems    Objective   /78   Pulse 76   Resp 16   Ht 1.448 m (4' 9\")   Wt (!) 37.4 kg (82 lb 6.4 oz)   BMI 17.83 kg/m²    Physical Exam     GEN: NAD  HEENT: normal  NECK: no adenopathy, no thyroid enlargment  LUNGS: CTAB  CV: reg S1/S2 no murmurs  EXT: no leg edema   Assessment/Plan   Problem List Items Addressed This Visit       Hyperlipidemia - remain on simvastatin .     Migraine, unspecified, not intractable, without status migrainosus  some of her headaches may be due to chronic sleep deprivation.  Discuss changing sleep habits. She did not need refill of sumatriptan.     Type 2 diabetes mellitus with hyperglycemia, without long-term current use of insulin remain on saxagliptin. Pt had diarrhea that required a hospitalization while on metformin. She had significant  hypoglycemia when she was on glimepiride.      Relevant Medications    sAXagliptin (Onglyza) 2.5 mg tablet    " Other Relevant Orders    Hemoglobin A1C

## 2024-04-23 NOTE — PATIENT INSTRUCTIONS
See me again in 3 months.     Get the blood test a week or two before your next visit.    Statement Selected

## 2024-04-30 ENCOUNTER — PATIENT OUTREACH (OUTPATIENT)
Dept: PRIMARY CARE | Facility: CLINIC | Age: 54
End: 2024-04-30
Payer: COMMERCIAL

## 2024-06-04 ENCOUNTER — OFFICE VISIT (OUTPATIENT)
Dept: GASTROENTEROLOGY | Facility: CLINIC | Age: 54
End: 2024-06-04
Payer: COMMERCIAL

## 2024-06-04 VITALS
SYSTOLIC BLOOD PRESSURE: 120 MMHG | TEMPERATURE: 98.1 F | BODY MASS INDEX: 17.47 KG/M2 | HEIGHT: 57 IN | HEART RATE: 80 BPM | WEIGHT: 81 LBS | DIASTOLIC BLOOD PRESSURE: 78 MMHG

## 2024-06-04 DIAGNOSIS — R74.8 ELEVATED SERUM ALKALINE PHOSPHATASE LEVEL: ICD-10-CM

## 2024-06-04 DIAGNOSIS — K83.8 COMMON BILE DUCT DILATATION: Primary | ICD-10-CM

## 2024-06-04 PROCEDURE — 99202 OFFICE O/P NEW SF 15 MIN: CPT | Performed by: INTERNAL MEDICINE

## 2024-06-04 PROCEDURE — 99212 OFFICE O/P EST SF 10 MIN: CPT | Performed by: INTERNAL MEDICINE

## 2024-06-04 PROCEDURE — 3078F DIAST BP <80 MM HG: CPT | Performed by: INTERNAL MEDICINE

## 2024-06-04 PROCEDURE — 3051F HG A1C>EQUAL 7.0%<8.0%: CPT | Performed by: INTERNAL MEDICINE

## 2024-06-04 PROCEDURE — 3074F SYST BP LT 130 MM HG: CPT | Performed by: INTERNAL MEDICINE

## 2024-06-04 NOTE — PATIENT INSTRUCTIONS
We agree with the endoscopic ultrasound to check into the dilated bile duct and elevated bile duct enzyme level.

## 2024-06-04 NOTE — PROGRESS NOTES
Subjective   Patient ID: Mel Lantigua is a 54 y.o. female who presents for No chief complaint on file..  Referred by Dr. Peralta for a dilated bile duct and elevated bile duct    GB removed due to gallstone attack more than a few years ago in [facility]  Laparoscopic approach  No complications    No family history of pancreatic or bile duct disease  No pain and weight stable   Weight went from 100 to 78 lbs then retained to 81  Stopped metformin and diarrhea resolved     Later she endorsed pain in the form of nausea and indigestion with fatty foods like new and fast food burgers          Review of Systems    Objective   Physical Exam  Constitutional:       Appearance: Normal appearance.   Neurological:      Mental Status: She is alert.         Assessment/Plan   Problem List Items Addressed This Visit             ICD-10-CM    Common bile duct dilatation - Primary K83.8    Relevant Orders    Endoscopic Ultrasound (Upper)    Elevated serum alkaline phosphatase level R74.8    Relevant Orders    Endoscopic Ultrasound (Upper)            Edi Kothari DO 06/04/24 10:57 AM

## 2024-06-16 NOTE — H&P
History Of Present Illness  Mel Lantigua is a 54 y.o. female presenting with dilated bile duct.     Past Medical History  Past Medical History:   Diagnosis Date    Abnormal finding of blood chemistry, unspecified 12/07/2017    Abnormal blood chemistry    Candidiasis of skin and nail 07/17/2015    Candidal intertrigo    Depression     Hyperlipidemia     Impacted cerumen, bilateral 01/15/2021    Bilateral impacted cerumen    Otalgia, right ear 01/15/2021    Right ear pain    Other chest pain 05/14/2013    Chest tightness or pressure    Other conditions influencing health status     History Of ___ Previous Pregnancies    Personal history of diseases of the skin and subcutaneous tissue     History of rosacea    Personal history of other diseases of the digestive system     History of esophageal reflux    Personal history of other diseases of the nervous system and sense organs     History of migraine headaches    Personal history of other diseases of the respiratory system 12/27/2019    History of acute bronchitis with bronchospasm    Personal history of other endocrine, nutritional and metabolic disease     History of diabetes mellitus    Personal history of other endocrine, nutritional and metabolic disease     History of hyperlipidemia    Personal history of other mental and behavioral disorders     History of depression    Personal history of other specified conditions 06/04/2020    History of abdominal pain    Personal history of other specified conditions 05/14/2013    History of chest pain    Right upper quadrant pain 01/15/2021    Abdominal pain, RUQ (right upper quadrant)    Spondylosis without myelopathy or radiculopathy, cervical region     Cervical spondylosis    Urinary tract infection, site not specified 07/02/2020    Acute UTI     Surgical History  Past Surgical History:   Procedure Laterality Date    CERVICAL LAMINECTOMY  06/27/2012    Laminectomy Cervical    CHOLECYSTECTOMY      OTHER SURGICAL  HISTORY  04/30/2021    Complete colonoscopy    TUBAL LIGATION  06/08/2012    Tubal Ligation     Social History  She reports that she has been smoking cigarettes. She started smoking about 2 years ago. She has a 2 pack-year smoking history. She has never been exposed to tobacco smoke. She has never used smokeless tobacco. She reports that she does not currently use alcohol. She reports that she does not use drugs.    Family History  Family History   Problem Relation Name Age of Onset    Endometriosis Mother      Osteoporosis Mother      Skin cancer Mother      Leukemia Father      Other (thyroid disorder) Brother      Colon cancer Mother's Sister  54    Breast cancer Maternal Grandmother          Allergies  Allergies   Allergen Reactions    Latex Hives and Itching     Review of Systems     Physical Exam     Last Recorded Vitals  There were no vitals taken for this visit.    Assessment/Plan   Bile duct dilation    Proceed with EUS and possible ERCP     Conrad Alex MD

## 2024-06-18 ENCOUNTER — ANESTHESIA EVENT (OUTPATIENT)
Dept: GASTROENTEROLOGY | Facility: HOSPITAL | Age: 54
End: 2024-06-18
Payer: COMMERCIAL

## 2024-06-18 ENCOUNTER — HOSPITAL ENCOUNTER (OUTPATIENT)
Dept: GASTROENTEROLOGY | Facility: HOSPITAL | Age: 54
Discharge: HOME | End: 2024-06-18
Payer: COMMERCIAL

## 2024-06-18 ENCOUNTER — ANESTHESIA (OUTPATIENT)
Dept: GASTROENTEROLOGY | Facility: HOSPITAL | Age: 54
End: 2024-06-18
Payer: COMMERCIAL

## 2024-06-18 VITALS
RESPIRATION RATE: 18 BRPM | TEMPERATURE: 97.7 F | OXYGEN SATURATION: 96 % | HEIGHT: 57 IN | DIASTOLIC BLOOD PRESSURE: 67 MMHG | BODY MASS INDEX: 17.6 KG/M2 | HEART RATE: 78 BPM | SYSTOLIC BLOOD PRESSURE: 111 MMHG | WEIGHT: 81.57 LBS

## 2024-06-18 DIAGNOSIS — K83.8 COMMON BILE DUCT DILATATION: ICD-10-CM

## 2024-06-18 DIAGNOSIS — R74.8 ELEVATED SERUM ALKALINE PHOSPHATASE LEVEL: ICD-10-CM

## 2024-06-18 LAB
GLUCOSE BLD MANUAL STRIP-MCNC: 139 MG/DL (ref 74–99)
GLUCOSE BLD MANUAL STRIP-MCNC: 164 MG/DL (ref 74–99)

## 2024-06-18 PROCEDURE — 2500000004 HC RX 250 GENERAL PHARMACY W/ HCPCS (ALT 636 FOR OP/ED): Performed by: INTERNAL MEDICINE

## 2024-06-18 PROCEDURE — 82947 ASSAY GLUCOSE BLOOD QUANT: CPT

## 2024-06-18 PROCEDURE — 2500000005 HC RX 250 GENERAL PHARMACY W/O HCPCS: Performed by: NURSE ANESTHETIST, CERTIFIED REGISTERED

## 2024-06-18 PROCEDURE — 3700000001 HC GENERAL ANESTHESIA TIME - INITIAL BASE CHARGE

## 2024-06-18 PROCEDURE — 7100000009 HC PHASE TWO TIME - INITIAL BASE CHARGE

## 2024-06-18 PROCEDURE — 7100000010 HC PHASE TWO TIME - EACH INCREMENTAL 1 MINUTE

## 2024-06-18 PROCEDURE — 3700000002 HC GENERAL ANESTHESIA TIME - EACH INCREMENTAL 1 MINUTE

## 2024-06-18 PROCEDURE — 2500000004 HC RX 250 GENERAL PHARMACY W/ HCPCS (ALT 636 FOR OP/ED): Performed by: NURSE ANESTHETIST, CERTIFIED REGISTERED

## 2024-06-18 PROCEDURE — 43237 ENDOSCOPIC US EXAM ESOPH: CPT | Performed by: INTERNAL MEDICINE

## 2024-06-18 RX ORDER — PROPOFOL 10 MG/ML
INJECTION, EMULSION INTRAVENOUS CONTINUOUS PRN
Status: DISCONTINUED | OUTPATIENT
Start: 2024-06-18 | End: 2024-06-18

## 2024-06-18 RX ORDER — SODIUM CHLORIDE, SODIUM LACTATE, POTASSIUM CHLORIDE, CALCIUM CHLORIDE 600; 310; 30; 20 MG/100ML; MG/100ML; MG/100ML; MG/100ML
20 INJECTION, SOLUTION INTRAVENOUS CONTINUOUS
Status: DISCONTINUED | OUTPATIENT
Start: 2024-06-18 | End: 2024-06-19 | Stop reason: HOSPADM

## 2024-06-18 RX ORDER — LIDOCAINE HYDROCHLORIDE 20 MG/ML
INJECTION, SOLUTION EPIDURAL; INFILTRATION; INTRACAUDAL; PERINEURAL AS NEEDED
Status: DISCONTINUED | OUTPATIENT
Start: 2024-06-18 | End: 2024-06-18

## 2024-06-18 ASSESSMENT — PAIN - FUNCTIONAL ASSESSMENT
PAIN_FUNCTIONAL_ASSESSMENT: UNABLE TO SELF-REPORT
PAIN_FUNCTIONAL_ASSESSMENT: 0-10

## 2024-06-18 ASSESSMENT — COLUMBIA-SUICIDE SEVERITY RATING SCALE - C-SSRS
6. HAVE YOU EVER DONE ANYTHING, STARTED TO DO ANYTHING, OR PREPARED TO DO ANYTHING TO END YOUR LIFE?: NO
2. HAVE YOU ACTUALLY HAD ANY THOUGHTS OF KILLING YOURSELF?: NO
1. IN THE PAST MONTH, HAVE YOU WISHED YOU WERE DEAD OR WISHED YOU COULD GO TO SLEEP AND NOT WAKE UP?: NO

## 2024-06-18 ASSESSMENT — PAIN SCALES - GENERAL
PAINLEVEL_OUTOF10: 0 - NO PAIN

## 2024-06-18 ASSESSMENT — PATIENT HEALTH QUESTIONNAIRE - PHQ9
SUM OF ALL RESPONSES TO PHQ9 QUESTIONS 1 AND 2: 0
2. FEELING DOWN, DEPRESSED OR HOPELESS: NOT AT ALL
1. LITTLE INTEREST OR PLEASURE IN DOING THINGS: NOT AT ALL

## 2024-06-18 ASSESSMENT — ENCOUNTER SYMPTOMS
OCCASIONAL FEELINGS OF UNSTEADINESS: 0
DEPRESSION: 0
LOSS OF SENSATION IN FEET: 0

## 2024-06-18 NOTE — PERIOPERATIVE NURSING NOTE
0904 Pt to [facility] on nasal cannula. Nc removed and pt placed on room air. Bedside report given to this rn by or rn and aa.    0906 Pt  brought back to bedside.    0912 Pt blood sugar reads 139.    0916 Dr Alex at bedside for update.    0934 Discharge instructions provided to pt and family. Educated on medications, effects of anesthesia, and homecoming care. Pt and family verbalizing understanding of all instructions provided at this time. All questions and concerns answered. Pt given contact information for provider.     0940 Pt assisted with dressing with help of family. IV removed dressing applied.

## 2024-06-18 NOTE — ANESTHESIA POSTPROCEDURE EVALUATION
Patient: Mel Lantigua    Procedure Summary       Date: 06/18/24 Room / Location: [facility]    Anesthesia Start: 0837 Anesthesia Stop: 0905    Procedure: ENDOSCOPIC ULTRASOUND (UPPER) Diagnosis:       Common bile duct dilatation      Elevated serum alkaline phosphatase level    Scheduled Providers: Conrad Alex MD; Lorne Hawk MD; KEDAR Norman-ALYSSIA; Tamra Sewell RN Responsible Provider: Lorne Hawk MD    Anesthesia Type: MAC ASA Status: 3            Anesthesia Type: MAC    Vitals Value Taken Time   /67 06/18/24 0934   Temp 36.5 °C (97.7 °F) 06/18/24 0934   Pulse 78 06/18/24 0934   Resp 18 06/18/24 0934   SpO2 96 % 06/18/24 0934       Anesthesia Post Evaluation    Patient location during evaluation: PACU  Patient participation: complete - patient participated  Level of consciousness: awake and alert  Pain management: adequate  Airway patency: patent  Cardiovascular status: acceptable and hemodynamically stable  Respiratory status: acceptable, spontaneous ventilation and nonlabored ventilation  Hydration status: acceptable  Postoperative Nausea and Vomiting: none        There were no known notable events for this encounter.

## 2024-06-18 NOTE — ANESTHESIA PREPROCEDURE EVALUATION
Patient: Mel Lantigua    Procedure Information       Date/Time: 06/18/24 0830    Scheduled providers: Conrad Alex MD; Lorne Hawk MD; HEENA Norman    Procedure: ENDOSCOPIC ULTRASOUND (UPPER)    Location: [facility]            Relevant Problems   Cardiac   (+) Hyperlipidemia      Neuro   (+) Major depressive disorder   (+) Stress reaction, acute, predominant emotional      GI   (+) Esophageal reflux      Endocrine   (+) Type 2 diabetes mellitus with hyperglycemia, without long-term current use of insulin (Multi)      Musculoskeletal   (+) Cervical spondylosis      HEENT   (+) Acute sinusitis   (+) Hearing loss      ID   (+) COVID-19 virus infection      Skin   (+) Eczema of both hands   (+) Eczema of face       Clinical information reviewed:   Tobacco  Allergies  Meds   Med Hx  Surg Hx  OB Status  Fam Hx  Soc   Hx         Past Medical History:   Diagnosis Date    Depression     Diabetes mellitus (Multi)     GERD (gastroesophageal reflux disease)     Hyperlipidemia     Migraines       Past Surgical History:   Procedure Laterality Date    CERVICAL FUSION      CHOLECYSTECTOMY  09/16/2021    COLONOSCOPY      ESOPHAGOGASTRODUODENOSCOPY      TUBAL LIGATION       Social History     Tobacco Use    Smoking status: Every Day     Current packs/day: 1.00     Average packs/day: 1 pack/day for 2.0 years (2.0 ttl pk-yrs)     Types: Cigarettes     Start date: 6/10/2022     Passive exposure: Never    Smokeless tobacco: Never   Vaping Use    Vaping status: Never Used   Substance Use Topics    Alcohol use: Not Currently    Drug use: Never      Current Outpatient Medications   Medication Instructions    albuterol 90 mcg/actuation inhaler 2 puffs, inhalation, Every 6 hours PRN    citalopram (CELEXA) 40 mg, oral, Daily    halobetasol (UltraVATE) 0.05 % cream Apply small amount twice a day as needed to cracked fingertips.    ibuprofen-acetaminophen 125-250 mg tablet per tablet Ibuprofen    "Quantity: 0   Refills: 0   Ordered: 12-Jun-2019  Karli Simpson A Generic Substitution Allowed    ibuprofen 600 mg, oral    loperamide (IMODIUM) 2 mg, oral, 4 times daily PRN    naproxen (NAPROSYN) 500 mg, oral, 2 times daily PRN    naproxen-diphenhydramine 220-25 mg tablet Naproxen   Quantity: 0   Refills: 0   Ordered: 12-Jun-2019  Karli Simpson A Generic Substitution Allowed    ondansetron (ZOFRAN) 4 mg, oral, Every 8 hours PRN    ondansetron ODT (ZOFRAN-ODT) 4 mg, oral, Every 8 hours PRN    simvastatin (ZOCOR) 40 mg, oral, Nightly    SUMAtriptan (Imitrex) 100 mg tablet Take one tablet at the start of a migraine. Can repeat once in two hours if migraine did not go away. Use only two pills in 24 hours.      Allergies   Allergen Reactions    Latex Hives and Itching        Chemistry    Lab Results   Component Value Date/Time     01/27/2024 1018    K 4.1 01/27/2024 1018     01/27/2024 1018    CO2 34 (H) 01/27/2024 1018    BUN 10 01/27/2024 1018    CREATININE 0.64 01/27/2024 1018    Lab Results   Component Value Date/Time    CALCIUM 10.1 01/27/2024 1018    ALKPHOS 77 01/27/2024 1018    AST 17 01/27/2024 1018    ALT 12 01/27/2024 1018    BILITOT 0.5 01/27/2024 1018          Lab Results   Component Value Date    HGBA1C 7.0 (H) 03/05/2024     Lab Results   Component Value Date/Time    WBC 9.5 03/13/2023 1152    HGB 13.9 03/13/2023 1152    HCT 42.6 03/13/2023 1152     03/13/2023 1152     No results found for: \"PROTIME\", \"PTT\", \"INR\"  No results found for: \"ABORH\"  No results found for this or any previous visit (from the past 4464 hour(s)).  No results found for this or any previous visit from the past 1095 days.   Echo 5/24/2018@[facility]:  LEFT VENTRICLE   The left ventricle is small.   There is no left ventricular hypertrophy.   Left ventricular systolic function is normal. Global LV myocardial strain is   normal.     Normal left ventricular diastolic function.   Mitral annular lateral E/e': 9.8. Mitral " annular septal E/e': 9.8.   Wall Motion:   All scored segments are normal.     RIGHT VENTRICLE   The right ventricle is normal in size.   Right ventricular systolic function is normal. RV systolic tissue Doppler velocity    is 12.4 cm/s.   Estimated right ventricular systolic pressure is 18 mmHg consistent with normal   pulmonary artery pressures. Estimated right atrial pressure is 0 mmHg.     LEFT ATRIUM   The left atrial cavity is normal in size.     Pulmonary Veins:   The pulmonary venous pattern showed normal systolic flow.     RIGHT ATRIUM   The right atrial cavity is normal in size.   Inferior Vena Cava:   The inferior vena cava appears small measuring 1.2 cm. The vessel decreases   greater than 50 percent with inspiration.     MITRAL VALVE   The mitral valve leaflets are structurally normal. Native mitral valve. There is   trivial mitral valve regurgitation. The pressure half time is 61 msec. The peak   mitral E/A ratio is 1.08. The average mitral E/e' ratio is 9.8. The mitral flow   deceleration time is 209 msec.     TRICUSPID VALVE   The tricuspid valve leaflets are structurally normal. Native tricuspid valve.   There is trivial (trivial - 1+) tricuspid valve regurgitation. The hepatic venous   pattern showed normal systolic flow.     AORTIC VALVE   The aortic valve cusps are structurally normal. There is no aortic valve stenosis.    There is no aortic valve regurgitation. Tricuspid aortic valve. There is mild   thickening. The LVOT diameter is 1.9 cm.     PULMONIC VALVE   The pulmonic valve cusps are structurally normal. There is trivial pulmonic valve   regurgitation.     AORTA   The visualized aorta is normal in size.   Measurements - Sinus 2.5 cm. Sinotubular junction 2.5 cm. Mid ascending aorta 2.7   cm.     PULMONARY ARTERIES   The pulmonary arteries are normal.     INTERATRIAL SEPTUM   The interatrial septum is normal.     INTERVENTRICULAR SEPTUM   The interventricular septum is normal.  "    PERICARDIUM   The pericardium is normal. There is no pericardial effusion.     Visit Vitals  /77   Pulse 75   Temp 36.4 °C (97.5 °F) (Temporal)   Resp 18   Ht 1.448 m (4' 9\")   Wt (!) 37 kg (81 lb 9.1 oz)   SpO2 98%   BMI 17.65 kg/m²   Smoking Status Every Day   BSA 1.22 m²     NPO/Void Status  Carbohydrate Drink Given Prior to Surgery? : N  Date of Last Liquid: 06/18/24  Time of Last Liquid: 0600  Date of Last Solid: 06/17/24  Time of Last Solid: 2130  Last Intake Type: Clear fluids  Time of Last Void: 0741         Physical Exam    Airway  Mallampati: III  TM distance: >3 FB  Neck ROM: full     Cardiovascular   Rhythm: regular  Rate: normal     Dental - normal exam     Pulmonary   Breath sounds clear to auscultation     Abdominal - normal exam              Anesthesia Plan    History of general anesthesia?: yes  History of complications of general anesthesia?: no    ASA 3     MAC   (With standard ASA monitoring.)  intravenous induction   Anesthetic plan and risks discussed with patient.    Plan discussed with CRNA and CAA.        "

## 2024-06-18 NOTE — DISCHARGE INSTRUCTIONS

## 2024-06-19 ASSESSMENT — PAIN SCALES - GENERAL: PAINLEVEL_OUTOF10: 0 - NO PAIN

## 2024-07-02 DIAGNOSIS — R11.0 NAUSEA: ICD-10-CM

## 2024-07-02 NOTE — TELEPHONE ENCOUNTER
Please refill.    ondansetron ODT (Zofran-ODT) 4 mg disintegrating tablet   4 mg, Every 8 hours PRN   [facility]

## 2024-07-03 RX ORDER — ONDANSETRON 4 MG/1
4 TABLET, ORALLY DISINTEGRATING ORAL EVERY 8 HOURS PRN
Qty: 30 TABLET | Refills: 0 | Status: SHIPPED | OUTPATIENT
Start: 2024-07-03

## 2024-07-23 ENCOUNTER — APPOINTMENT (OUTPATIENT)
Dept: PRIMARY CARE | Facility: CLINIC | Age: 54
End: 2024-07-23
Payer: COMMERCIAL

## 2024-07-25 ENCOUNTER — APPOINTMENT (OUTPATIENT)
Dept: PRIMARY CARE | Facility: CLINIC | Age: 54
End: 2024-07-25
Payer: COMMERCIAL

## 2024-12-18 ENCOUNTER — APPOINTMENT (OUTPATIENT)
Dept: PRIMARY CARE | Facility: CLINIC | Age: 54
End: 2024-12-18
Payer: COMMERCIAL

## 2025-01-02 DIAGNOSIS — F33.41 RECURRENT MAJOR DEPRESSIVE DISORDER, IN PARTIAL REMISSION (CMS-HCC): ICD-10-CM

## 2025-01-02 RX ORDER — CITALOPRAM 40 MG/1
40 TABLET, FILM COATED ORAL DAILY
Qty: 90 TABLET | Refills: 1 | Status: SHIPPED | OUTPATIENT
Start: 2025-01-02

## 2025-01-14 ENCOUNTER — APPOINTMENT (OUTPATIENT)
Dept: PRIMARY CARE | Facility: CLINIC | Age: 55
End: 2025-01-14

## 2025-02-18 ENCOUNTER — APPOINTMENT (OUTPATIENT)
Dept: PRIMARY CARE | Facility: CLINIC | Age: 55
End: 2025-02-18

## 2025-02-22 DIAGNOSIS — E78.2 MIXED HYPERLIPIDEMIA: ICD-10-CM

## 2025-02-24 RX ORDER — SIMVASTATIN 40 MG/1
40 TABLET, FILM COATED ORAL NIGHTLY
Qty: 30 TABLET | Refills: 0 | Status: SHIPPED | OUTPATIENT
Start: 2025-02-24

## 2025-04-07 DIAGNOSIS — R11.0 NAUSEA: ICD-10-CM

## 2025-04-07 DIAGNOSIS — E78.2 MIXED HYPERLIPIDEMIA: ICD-10-CM

## 2025-04-07 NOTE — TELEPHONE ENCOUNTER
Patient called and states that she needs refills on:    Simvastatin 40 mg  Zofran 4mg      Maury Lantigua  [phone]

## 2025-04-08 RX ORDER — SIMVASTATIN 40 MG/1
40 TABLET, FILM COATED ORAL NIGHTLY
Qty: 30 TABLET | Refills: 1 | Status: SHIPPED | OUTPATIENT
Start: 2025-04-08

## 2025-04-08 RX ORDER — ONDANSETRON 4 MG/1
4 TABLET, ORALLY DISINTEGRATING ORAL EVERY 8 HOURS PRN
Qty: 30 TABLET | Refills: 0 | Status: SHIPPED | OUTPATIENT
Start: 2025-04-08

## 2025-04-30 DIAGNOSIS — E11.65 TYPE 2 DIABETES MELLITUS WITH HYPERGLYCEMIA, WITHOUT LONG-TERM CURRENT USE OF INSULIN: Primary | ICD-10-CM

## 2025-05-01 RX ORDER — SAXAGLIPTIN 2.5 MG/1
2.5 TABLET, FILM COATED ORAL DAILY
Qty: 30 TABLET | Refills: 0 | Status: SHIPPED | OUTPATIENT
Start: 2025-05-01

## 2025-05-24 NOTE — PROGRESS NOTES
"Subjective   Patient ID: Mle Lantigua is a 55 y.o. female who presents for follow up.    HPI   Patient presents with her Ex,  Demarcus.    She was last here April of 2024 as she had lost her health insurance for a time.     She says that she did not get her blood tests done     Says that if she does not eat right before bed, her sugar is \"good\" in the morning  Her sugar was \"very bad\"  over 200, yesterday according to her ex. She didn't eat until she got a recheck sugar, drank lots of water. She also was not feeling well.   She is on Saxagliptin (onglyza)\      She is fatigued and always wants to sleep.     She also has dry hands, that hurt and itch.  She uses Vaseline on it    She also complaints of hair loss and developing a bald spot on her scalp.   She denies having itching of her scalp.   She has a large scaly patch on the top of her head.   She has not been using any special kind of shampoo.     Patient has chronic cough.  She said she is a heavy smoker ,at least a pack a day    She says she gets a headache or a migraine \"every other day\"  She said she is taking sumatriptan which helps  She said that she commonly wakes up with a migraine  She is living with Demarcus, her mom, her daughter and her two kids and the son-in law and his two kids.   She reports her diarrhea has resolved.  She saw GI and underwent an endoscopic ultrasound which was negative.             Latest Reference Range & Units 01/27/24 10:18 03/05/24 06:01   GLUCOSE 74 - 99 mg/dL 107 (H)    SODIUM 136 - 145 mmol/L 144    POTASSIUM 3.5 - 5.3 mmol/L 4.1    CHLORIDE 98 - 107 mmol/L 101    Bicarbonate 21 - 32 mmol/L 34 (H)    Anion Gap 10 - 20 mmol/L 13    Blood Urea Nitrogen 6 - 23 mg/dL 10    Creatinine 0.50 - 1.05 mg/dL 0.64    EGFR >60 mL/min/1.73m*2 >90    Calcium 8.6 - 10.6 mg/dL 10.1    Albumin 3.4 - 5.0 g/dL 4.4    Alkaline Phosphatase 33 - 110 U/L 77    ALT 7 - 45 U/L 12    AST 9 - 39 U/L 17    Bilirubin Total 0.0 - 1.2 mg/dL 0.5    Total " "Protein 6.4 - 8.2 g/dL 7.0    Hemoglobin A1C 4.3 - 5.6 % 7.0 (H) 7.0 (H) (E)   Estimated Average Glucose mg/dL 154 154 (E)   Vitamin D, 25-Hydroxy, Total 30 - 100 ng/mL 19 (L)    (H): Data is abnormally high  (L): Data is abnormally low  (E): External lab result    Review of Systems   Respiratory:  Negative for shortness of breath.    Gastrointestinal:  Negative for constipation and diarrhea.       Current Medications[1]    Objective   /84   Pulse 83   Resp 16   Ht 1.448 m (4' 9\")   Wt (!) 38.2 kg (84 lb 3.2 oz)   BMI 18.22 kg/m²     Physical Exam  Constitutional:       Appearance: Normal appearance.   HENT:      Head:        Comments: There is a band of yellow, scaly scalp across most of the top of her scalp.  There is an area underneath her pony tail that has hair loss, about 1.5 cm diameter. No fluctuance.      Mouth/Throat:      Mouth: Mucous membranes are moist.      Pharynx: Oropharynx is clear.   Eyes:      Conjunctiva/sclera: Conjunctivae normal.      Pupils: Pupils are equal, round, and reactive to light.   Cardiovascular:      Rate and Rhythm: Normal rate and regular rhythm.      Heart sounds: Normal heart sounds.   Pulmonary:      Effort: Pulmonary effort is normal.      Breath sounds: Normal breath sounds.   Abdominal:      General: Bowel sounds are normal. There is no distension.      Palpations: Abdomen is soft. There is no mass.      Tenderness: There is no abdominal tenderness.   Lymphadenopathy:      Cervical: No cervical adenopathy.   Skin:     General: Skin is warm and dry.   Neurological:      General: No focal deficit present.         Assessment/Plan   Problem List Items Addressed This Visit       Daily nausea    Relevant Medications    ondansetron (Zofran) 4 mg tablet    Hand eczema    Use Clobetasol (temovate) 0.05% Solutions. Apply twice daily for 4 weeks.     Referral to dermatology provided.          Relevant Orders    Referral to Dermatology    Follow Up In [facility] " - PCP - Established    Eczema of scalp    Use Fluocinonide (lidex) 0.05% ointment, apply and leave on for 30 minutes and then shower to remove it. Do this once a day for 2 weeks and then every other day.     Dermatology referral was given for further evaluation.          Relevant Medications    fluocinonide (Lidex) 0.05 % ointment    fluocinolone (Derma-Smoothe/FS Body Oil) 0.01 % external oil    Other Relevant Orders    Referral to Dermatology    Follow Up In [facility] - PCP - Established    Fatigue    Relevant Orders    TSH with reflex to Free T4 if abnormal    CBC and Auto Differential    Follow Up In [facility] - PCP - Established    Hyperlipidemia    Continue taking simvastatin 40 mg daily          Relevant Medications    simvastatin (Zocor) 40 mg tablet    Other Relevant Orders    Lipid Panel    Follow Up In [facility] - PCP - Established    Major depressive disorder    Continue Citalopram ( CeleXA) 40 mg         Relevant Medications    citalopram (CeleXA) 40 mg tablet    Other Relevant Orders    Follow Up In [facility] - PCP - Established    Type 2 diabetes mellitus with hyperglycemia, without long-term current use of insulin - Primary    A1c 7.0  remain on saxagliptin. Patients diarrhea that required a hospitalization has resolved.           Relevant Medications    sAXagliptin (Onglyza) 2.5 mg tablet    Other Relevant Orders    Comprehensive Metabolic Panel    Lipid Panel    Hemoglobin A1C    TSH with reflex to Free T4 if abnormal    CBC and Auto Differential    Follow Up In [facility] - PCP - Established     Other Visit Diagnoses         Hair loss        Relevant Orders    Referral to Dermatology    Follow Up In [facility] - PCP - Established              Please return to see me in 2 months for a 30 minute follow up.     Scribe Attestation  By signing my name below, IKelly, Jono   attest that this documentation has been prepared  under the direction and in the presence of Xiomara Zelaya DO.    Scribe Attestation  By signing my name below, I, Nelly Jono Vaughan attest that this documentation has been prepared under the direction and in the presence of Xiomara Zelaya DO. All medical record entries made by the Scribe were at my direction or personally dictated by me. I have reviewed the chart and agree that the record accurately reflects my personal performance of the history, physical exam, discussion and plan.          [1]   Current Outpatient Medications:     albuterol 90 mcg/actuation inhaler, Inhale 2 puffs every 6 hours if needed for wheezing., Disp: , Rfl:     citalopram (CeleXA) 40 mg tablet, Take 1 tablet (40 mg) by mouth once daily., Disp: 90 tablet, Rfl: 0    fluocinolone (Derma-Smoothe/FS Body Oil) 0.01 % external oil, Apply to scalp once nightly and wash off 30 min afterward., Disp: 118 mL, Rfl: 0    fluocinonide (Lidex) 0.05 % ointment, Apply topically 2 times a day. Apply to hands twice daily for four weeks., Disp: 60 g, Rfl: 0    halobetasol (UltraVATE) 0.05 % cream, Apply small amount twice a day as needed to cracked fingertips., Disp: 15 g, Rfl: 2    ibuprofen 600 mg tablet, Take 1 tablet (600 mg) by mouth., Disp: , Rfl:     ibuprofen-acetaminophen 125-250 mg tablet per tablet, Ibuprofen  Quantity: 0  Refills: 0  Ordered: 12-Jun-2019 Karli Simpson A Generic Substitution Allowed, Disp: , Rfl:     loperamide (Imodium) 2 mg capsule, Take 1 capsule (2 mg) by mouth 4 times a day as needed for diarrhea., Disp: 60 capsule, Rfl: 0    naproxen (Naprosyn) 500 mg tablet, Take 1 tablet (500 mg) by mouth 2 times a day as needed for mild pain (1 - 3)., Disp: , Rfl:     naproxen-diphenhydramine 220-25 mg tablet, Naproxen  Quantity: 0  Refills: 0  Ordered: 12-Jun-2019 Karli Simpson A Generic Substitution Allowed, Disp: , Rfl:     ondansetron (Zofran) 4 mg tablet, Take 1 tablet (4 mg) by mouth every 8 hours if needed for nausea or  vomiting., Disp: 30 tablet, Rfl: 2    ondansetron ODT (Zofran-ODT) 4 mg disintegrating tablet, Dissolve 1 tablet (4 mg) in the mouth every 8 hours if needed for nausea or vomiting., Disp: 30 tablet, Rfl: 0    sAXagliptin (Onglyza) 2.5 mg tablet, Take 1 tablet (2.5 mg) by mouth once daily., Disp: 90 tablet, Rfl: 0    simvastatin (Zocor) 40 mg tablet, Take 1 tablet (40 mg) by mouth once daily at bedtime., Disp: 90 tablet, Rfl: 0    SUMAtriptan (Imitrex) 100 mg tablet, Take one tablet at the start of a migraine. Can repeat once in two hours if migraine did not go away. Use only two pills in 24 hours., Disp: 27 tablet, Rfl: 1

## 2025-05-27 ENCOUNTER — APPOINTMENT (OUTPATIENT)
Dept: PRIMARY CARE | Facility: CLINIC | Age: 55
End: 2025-05-27

## 2025-05-27 ENCOUNTER — TELEPHONE (OUTPATIENT)
Dept: PRIMARY CARE | Facility: CLINIC | Age: 55
End: 2025-05-27

## 2025-05-27 VITALS
HEIGHT: 57 IN | WEIGHT: 84.2 LBS | RESPIRATION RATE: 16 BRPM | SYSTOLIC BLOOD PRESSURE: 126 MMHG | DIASTOLIC BLOOD PRESSURE: 84 MMHG | HEART RATE: 83 BPM | BODY MASS INDEX: 18.16 KG/M2

## 2025-05-27 DIAGNOSIS — L30.9 ECZEMA OF SCALP: ICD-10-CM

## 2025-05-27 DIAGNOSIS — L30.9 CHRONIC DERMATITIS OF HANDS: ICD-10-CM

## 2025-05-27 DIAGNOSIS — R53.83 FATIGUE, UNSPECIFIED TYPE: ICD-10-CM

## 2025-05-27 DIAGNOSIS — R11.0 DAILY NAUSEA: ICD-10-CM

## 2025-05-27 DIAGNOSIS — F33.41 RECURRENT MAJOR DEPRESSIVE DISORDER, IN PARTIAL REMISSION: ICD-10-CM

## 2025-05-27 DIAGNOSIS — L65.9 HAIR LOSS: ICD-10-CM

## 2025-05-27 DIAGNOSIS — L30.9 HAND ECZEMA: ICD-10-CM

## 2025-05-27 DIAGNOSIS — E11.65 TYPE 2 DIABETES MELLITUS WITH HYPERGLYCEMIA, WITHOUT LONG-TERM CURRENT USE OF INSULIN: Primary | ICD-10-CM

## 2025-05-27 DIAGNOSIS — L65.9 ALOPECIA: ICD-10-CM

## 2025-05-27 DIAGNOSIS — E78.2 MIXED HYPERLIPIDEMIA: ICD-10-CM

## 2025-05-27 PROCEDURE — 3008F BODY MASS INDEX DOCD: CPT | Performed by: INTERNAL MEDICINE

## 2025-05-27 PROCEDURE — 3079F DIAST BP 80-89 MM HG: CPT | Performed by: INTERNAL MEDICINE

## 2025-05-27 PROCEDURE — 99215 OFFICE O/P EST HI 40 MIN: CPT | Performed by: INTERNAL MEDICINE

## 2025-05-27 PROCEDURE — 3074F SYST BP LT 130 MM HG: CPT | Performed by: INTERNAL MEDICINE

## 2025-05-27 RX ORDER — FLUOCINONIDE 0.05 MG/G
OINTMENT TOPICAL 2 TIMES DAILY
Qty: 60 G | Refills: 0 | Status: SHIPPED | OUTPATIENT
Start: 2025-05-27 | End: 2025-06-26

## 2025-05-27 RX ORDER — CLOBETASOL PROPIONATE 0.5 MG/ML
SOLUTION TOPICAL 2 TIMES DAILY
Qty: 50 ML | Refills: 2 | Status: SHIPPED | OUTPATIENT
Start: 2025-05-27 | End: 2025-05-27 | Stop reason: ALTCHOICE

## 2025-05-27 RX ORDER — SIMVASTATIN 40 MG/1
40 TABLET, FILM COATED ORAL NIGHTLY
Qty: 90 TABLET | Refills: 0 | Status: SHIPPED | OUTPATIENT
Start: 2025-05-27

## 2025-05-27 RX ORDER — ONDANSETRON 4 MG/1
4 TABLET, FILM COATED ORAL EVERY 8 HOURS PRN
Qty: 30 TABLET | Refills: 2 | Status: SHIPPED | OUTPATIENT
Start: 2025-05-27

## 2025-05-27 RX ORDER — FLUOCINOLONE ACETONIDE 0.11 MG/ML
OIL TOPICAL
Qty: 118 ML | Refills: 0 | Status: SHIPPED | OUTPATIENT
Start: 2025-05-27

## 2025-05-27 RX ORDER — CITALOPRAM 40 MG/1
40 TABLET ORAL DAILY
Qty: 90 TABLET | Refills: 0 | Status: SHIPPED | OUTPATIENT
Start: 2025-05-27

## 2025-05-27 RX ORDER — SAXAGLIPTIN 2.5 MG/1
2.5 TABLET, FILM COATED ORAL DAILY
Qty: 90 TABLET | Refills: 0 | Status: SHIPPED | OUTPATIENT
Start: 2025-05-27

## 2025-05-27 ASSESSMENT — PATIENT HEALTH QUESTIONNAIRE - PHQ9
1. LITTLE INTEREST OR PLEASURE IN DOING THINGS: NOT AT ALL
2. FEELING DOWN, DEPRESSED OR HOPELESS: NOT AT ALL
SUM OF ALL RESPONSES TO PHQ9 QUESTIONS 1 AND 2: 0

## 2025-05-27 ASSESSMENT — ENCOUNTER SYMPTOMS
CONSTIPATION: 0
DIARRHEA: 0
SHORTNESS OF BREATH: 0

## 2025-05-27 ASSESSMENT — PAIN SCALES - GENERAL: PAINLEVEL_OUTOF10: 0-NO PAIN

## 2025-05-27 NOTE — ASSESSMENT & PLAN NOTE
Use Clobetasol (temovate) 0.05% Solutions. Apply twice daily for 4 weeks.     Referral to dermatology provided.

## 2025-05-27 NOTE — TELEPHONE ENCOUNTER
[facility] in [place] said you ask for fluocinonide body oil for the patient scalp    They want you to know that they do have the scalp oil    They want a call back [phone]

## 2025-05-27 NOTE — ASSESSMENT & PLAN NOTE
Use Fluocinonide (lidex) 0.05% ointment, apply and leave on for 30 minutes and then shower to remove it. Do this once a day for 2 weeks and then every other day.     Dermatology referral was given for further evaluation.

## 2025-05-28 DIAGNOSIS — L21.9 SEBORRHEIC DERMATITIS OF SCALP: Primary | ICD-10-CM

## 2025-05-28 RX ORDER — FLUOCINOLONE ACETONIDE 0.11 MG/ML
OIL TOPICAL
Qty: 118.28 ML | Refills: 3 | Status: SHIPPED | OUTPATIENT
Start: 2025-05-28

## 2025-07-29 ENCOUNTER — APPOINTMENT (OUTPATIENT)
Dept: PRIMARY CARE | Facility: CLINIC | Age: 55
End: 2025-07-29
Payer: COMMERCIAL

## 2025-08-05 ENCOUNTER — PATIENT OUTREACH (OUTPATIENT)
Dept: CARE COORDINATION | Facility: CLINIC | Age: 55
End: 2025-08-05
Payer: COMMERCIAL

## 2025-08-05 DIAGNOSIS — Z12.31 ENCOUNTER FOR SCREENING MAMMOGRAM FOR BREAST CANCER: ICD-10-CM

## 2025-08-24 DIAGNOSIS — E78.2 MIXED HYPERLIPIDEMIA: ICD-10-CM

## 2025-08-25 RX ORDER — SIMVASTATIN 40 MG/1
40 TABLET, FILM COATED ORAL NIGHTLY
Qty: 90 TABLET | Refills: 0 | Status: SHIPPED | OUTPATIENT
Start: 2025-08-25

## 2025-08-26 ENCOUNTER — APPOINTMENT (OUTPATIENT)
Dept: PRIMARY CARE | Facility: CLINIC | Age: 55
End: 2025-08-26
Payer: COMMERCIAL

## 2025-09-19 ENCOUNTER — APPOINTMENT (OUTPATIENT)
Dept: PRIMARY CARE | Facility: CLINIC | Age: 55
End: 2025-09-19
Payer: COMMERCIAL